# Patient Record
Sex: FEMALE | Race: WHITE | NOT HISPANIC OR LATINO
[De-identification: names, ages, dates, MRNs, and addresses within clinical notes are randomized per-mention and may not be internally consistent; named-entity substitution may affect disease eponyms.]

---

## 2017-08-14 ENCOUNTER — APPOINTMENT (OUTPATIENT)
Dept: OBGYN | Facility: CLINIC | Age: 22
End: 2017-08-14

## 2018-03-10 ENCOUNTER — APPOINTMENT (OUTPATIENT)
Dept: OBGYN | Facility: CLINIC | Age: 23
End: 2018-03-10

## 2019-09-19 ENCOUNTER — APPOINTMENT (OUTPATIENT)
Dept: OBGYN | Facility: CLINIC | Age: 24
End: 2019-09-19
Payer: COMMERCIAL

## 2019-09-19 VITALS
SYSTOLIC BLOOD PRESSURE: 100 MMHG | HEIGHT: 64 IN | TEMPERATURE: 97.9 F | BODY MASS INDEX: 20.29 KG/M2 | WEIGHT: 118.83 LBS | DIASTOLIC BLOOD PRESSURE: 70 MMHG

## 2019-09-19 DIAGNOSIS — Z82.49 FAMILY HISTORY OF ISCHEMIC HEART DISEASE AND OTHER DISEASES OF THE CIRCULATORY SYSTEM: ICD-10-CM

## 2019-09-19 DIAGNOSIS — Z78.9 OTHER SPECIFIED HEALTH STATUS: ICD-10-CM

## 2019-09-19 DIAGNOSIS — Z80.0 FAMILY HISTORY OF MALIGNANT NEOPLASM OF DIGESTIVE ORGANS: ICD-10-CM

## 2019-09-19 DIAGNOSIS — Z87.440 PERSONAL HISTORY OF URINARY (TRACT) INFECTIONS: ICD-10-CM

## 2019-09-19 DIAGNOSIS — Z83.3 FAMILY HISTORY OF DIABETES MELLITUS: ICD-10-CM

## 2019-09-19 DIAGNOSIS — Z11.3 ENCOUNTER FOR SCREENING FOR INFECTIONS WITH A PREDOMINANTLY SEXUAL MODE OF TRANSMISSION: ICD-10-CM

## 2019-09-19 PROCEDURE — 36415 COLL VENOUS BLD VENIPUNCTURE: CPT

## 2019-09-19 PROCEDURE — 99385 PREV VISIT NEW AGE 18-39: CPT | Mod: 25

## 2019-09-19 NOTE — HISTORY OF PRESENT ILLNESS
[___ Year(s) Ago] : [unfilled] year(s) ago [Good] : being in good health [Healthy Diet] : a healthy diet [Regular Exercise] : regular exercise [Reproductive Age] : is of reproductive age [Sexually Active] : is sexually active [Menstrual Problems] : reports normal menses [Contraception] : does not use contraception

## 2019-09-19 NOTE — CHIEF COMPLAINT
[Initial Visit] : initial GYN visit [FreeTextEntry1] : Last exam 12/17 in [facility]\par Went off ocp (otc lo) 6/19 because she thought she was gaining weight. She has lost 15 lbs.\par Off ocp menses regular, not heavy, worse acne.\par hx of congenital kidney disorder requiring surgery 2005 (vesicoureteral reflux?), sees urologist q yr ( saw today). (Dr. Fajardo)\par Recently engaged, does not use bc at this time. Wants to make sure she does not have mense at wedding so would like to try a different ocp.\par Desires std testing today

## 2019-09-23 LAB
HBV SURFACE AG SER QL: NONREACTIVE
HIV1+2 AB SPEC QL IA.RAPID: NONREACTIVE
SOURCE AMPLIFICATION: NORMAL
T PALLIDUM AB SER QL IA: NEGATIVE
T VAGINALIS RRNA SPEC QL NAA+PROBE: NOT DETECTED

## 2020-03-02 ENCOUNTER — APPOINTMENT (OUTPATIENT)
Dept: OBGYN | Facility: CLINIC | Age: 25
End: 2020-03-02
Payer: COMMERCIAL

## 2020-03-02 VITALS
HEIGHT: 64 IN | SYSTOLIC BLOOD PRESSURE: 110 MMHG | DIASTOLIC BLOOD PRESSURE: 68 MMHG | BODY MASS INDEX: 19.63 KG/M2 | WEIGHT: 115 LBS

## 2020-03-02 DIAGNOSIS — Z86.19 PERSONAL HISTORY OF OTHER INFECTIOUS AND PARASITIC DISEASES: ICD-10-CM

## 2020-03-02 DIAGNOSIS — Z30.41 ENCOUNTER FOR SURVEILLANCE OF CONTRACEPTIVE PILLS: ICD-10-CM

## 2020-03-02 PROCEDURE — 99214 OFFICE O/P EST MOD 30 MIN: CPT

## 2020-03-02 NOTE — CHIEF COMPLAINT
[Follow Up] : follow up GYN visit [FreeTextEntry1] : Happy with ocp, no complaints.\par C/o vaginal and vulvalar itching since last week, was on vacation, has been using new soap, no recent abx. Min white vaginal dc, not clumpy.

## 2020-03-02 NOTE — PHYSICAL EXAM
[Awake] : awake [Alert] : alert [Acute Distress] : no acute distress [Mass] : no breast mass [Axillary LAD] : no axillary lymphadenopathy [Nipple Discharge] : no nipple discharge [Soft] : soft [Tender] : non tender [Oriented x3] : oriented to person, place, and time [Labia Majora Erythema] : erythema of the labia majora [Labia Minora Erythema] : erythema of the labia minora [Normal] : cervix [No Bleeding] : there was no active vaginal bleeding

## 2020-03-05 LAB
CANDIDA VAG CYTO: NOT DETECTED
G VAGINALIS+PREV SP MTYP VAG QL MICRO: NOT DETECTED
T VAGINALIS VAG QL WET PREP: NOT DETECTED

## 2020-10-22 ENCOUNTER — RX RENEWAL (OUTPATIENT)
Age: 25
End: 2020-10-22

## 2020-10-30 ENCOUNTER — APPOINTMENT (OUTPATIENT)
Dept: OBGYN | Facility: CLINIC | Age: 25
End: 2020-10-30
Payer: SELF-PAY

## 2020-10-30 VITALS
RESPIRATION RATE: 16 BRPM | TEMPERATURE: 97.3 F | BODY MASS INDEX: 20.14 KG/M2 | WEIGHT: 118 LBS | DIASTOLIC BLOOD PRESSURE: 64 MMHG | SYSTOLIC BLOOD PRESSURE: 102 MMHG | HEIGHT: 64 IN

## 2020-10-30 PROCEDURE — 99395 PREV VISIT EST AGE 18-39: CPT

## 2020-10-30 RX ORDER — TERCONAZOLE 8 MG/G
0.8 CREAM VAGINAL
Qty: 1 | Refills: 0 | Status: DISCONTINUED | COMMUNITY
Start: 2020-03-02 | End: 2020-10-30

## 2020-10-30 NOTE — HISTORY OF PRESENT ILLNESS
[Currently Active] : currently active [Men] : men [Vaginal] : vaginal [TextBox_4] : She did not get  in August, now planned for 7/21.\par No complaints. Happy with ocp. [PapSmeardate] : 2019

## 2020-10-30 NOTE — PHYSICAL EXAM
[Appropriately responsive] : appropriately responsive [Alert] : alert [No Acute Distress] : no acute distress [Soft] : soft [Non-tender] : non-tender [Non-distended] : non-distended [No HSM] : No HSM [No Lesions] : no lesions [No Mass] : no mass [Oriented x3] : oriented x3 [Examination Of The Breasts] : a normal appearance [No Masses] : no breast masses were palpable [Labia Majora] : normal [Labia Minora] : normal [Normal] : normal [Tenderness] : nontender [Enlarged ___ wks] : not enlarged [Mass ___ cm] : no uterine mass was palpated [Uterine Adnexae] : non-palpable [FreeTextEntry5] : pap done

## 2020-11-01 LAB
C TRACH RRNA SPEC QL NAA+PROBE: NOT DETECTED
N GONORRHOEA RRNA SPEC QL NAA+PROBE: NOT DETECTED
SOURCE TP AMPLIFICATION: NORMAL

## 2020-12-23 PROBLEM — Z86.19 HISTORY OF CANDIDAL VULVOVAGINITIS: Status: RESOLVED | Noted: 2020-03-02 | Resolved: 2020-12-23

## 2021-04-08 ENCOUNTER — RX RENEWAL (OUTPATIENT)
Age: 26
End: 2021-04-08

## 2021-04-30 ENCOUNTER — APPOINTMENT (OUTPATIENT)
Dept: OBGYN | Facility: CLINIC | Age: 26
End: 2021-04-30
Payer: COMMERCIAL

## 2021-04-30 VITALS
TEMPERATURE: 97.5 F | WEIGHT: 117 LBS | BODY MASS INDEX: 19.97 KG/M2 | RESPIRATION RATE: 16 BRPM | DIASTOLIC BLOOD PRESSURE: 62 MMHG | HEIGHT: 64 IN | SYSTOLIC BLOOD PRESSURE: 106 MMHG

## 2021-04-30 PROCEDURE — 99072 ADDL SUPL MATRL&STAF TM PHE: CPT

## 2021-04-30 PROCEDURE — 99213 OFFICE O/P EST LOW 20 MIN: CPT

## 2021-04-30 RX ORDER — NORETHINDRONE ACETATE AND ETHINYL ESTRADIOL 1MG-20(21)
1-20 KIT ORAL DAILY
Qty: 3 | Refills: 1 | Status: DISCONTINUED | COMMUNITY
Start: 2019-09-19 | End: 2021-04-30

## 2021-04-30 NOTE — HISTORY OF PRESENT ILLNESS
[Currently Active] : currently active [Men] : men [Vaginal] : vaginal [No] : No [FreeTextEntry1] : 6 month check on ocp. No complaints, getting  in July. [place] in [place].

## 2021-09-30 ENCOUNTER — RX RENEWAL (OUTPATIENT)
Age: 26
End: 2021-09-30

## 2021-11-05 ENCOUNTER — APPOINTMENT (OUTPATIENT)
Dept: OBGYN | Facility: CLINIC | Age: 26
End: 2021-11-05
Payer: COMMERCIAL

## 2021-11-05 VITALS
TEMPERATURE: 97.7 F | HEIGHT: 64 IN | WEIGHT: 121 LBS | DIASTOLIC BLOOD PRESSURE: 70 MMHG | BODY MASS INDEX: 20.66 KG/M2 | SYSTOLIC BLOOD PRESSURE: 110 MMHG

## 2021-11-05 DIAGNOSIS — Z01.419 ENCOUNTER FOR GYNECOLOGICAL EXAMINATION (GENERAL) (ROUTINE) W/OUT ABNORMAL FINDINGS: ICD-10-CM

## 2021-11-05 PROCEDURE — 99395 PREV VISIT EST AGE 18-39: CPT

## 2021-11-05 RX ORDER — ALBUTEROL 90 MCG
AEROSOL (GRAM) INHALATION
Refills: 0 | Status: ACTIVE | COMMUNITY

## 2021-11-05 NOTE — HISTORY OF PRESENT ILLNESS
[TextBox_4] : Got  over the summer. No gyn compaints. happy with OCP. [PapSmeardate] : 10/2020 [No] : Patient does not have concerns regarding sex [Currently Active] : currently active [Men] : men [Vaginal] : vaginal

## 2021-11-05 NOTE — PHYSICAL EXAM
[Appropriately responsive] : appropriately responsive [Alert] : alert [No Acute Distress] : no acute distress [No Murmurs] : no murmurs [Soft] : soft [Non-tender] : non-tender [Non-distended] : non-distended [No HSM] : No HSM [No Lesions] : no lesions [No Mass] : no mass [Oriented x3] : oriented x3 [Examination Of The Breasts] : a normal appearance [No Masses] : no breast masses were palpable [Labia Majora] : normal [Labia Minora] : normal [Normal] : normal [Tenderness] : nontender [Enlarged ___ wks] : not enlarged [Mass ___ cm] : no uterine mass was palpated [Uterine Adnexae] : non-palpable [FreeTextEntry5] : pap done

## 2022-04-27 ENCOUNTER — RX RENEWAL (OUTPATIENT)
Age: 27
End: 2022-04-27

## 2022-05-13 ENCOUNTER — APPOINTMENT (OUTPATIENT)
Dept: OBGYN | Facility: CLINIC | Age: 27
End: 2022-05-13
Payer: COMMERCIAL

## 2022-05-13 VITALS
HEIGHT: 64 IN | DIASTOLIC BLOOD PRESSURE: 60 MMHG | BODY MASS INDEX: 20.66 KG/M2 | SYSTOLIC BLOOD PRESSURE: 98 MMHG | WEIGHT: 121 LBS

## 2022-05-13 DIAGNOSIS — Z30.41 ENCOUNTER FOR SURVEILLANCE OF CONTRACEPTIVE PILLS: ICD-10-CM

## 2022-05-13 PROCEDURE — 99213 OFFICE O/P EST LOW 20 MIN: CPT

## 2022-05-13 NOTE — HISTORY OF PRESENT ILLNESS
[FreeTextEntry1] : 6 month check on ocp, no complaints, bought a house in [place], happy with ocp. [No] : Patient does not have concerns regarding sex [Currently Active] : currently active [Men] : men [Vaginal] : vaginal

## 2022-07-10 ENCOUNTER — EMERGENCY (EMERGENCY)
Facility: HOSPITAL | Age: 27
LOS: 0 days | Discharge: HOME | End: 2022-07-11
Attending: EMERGENCY MEDICINE
Payer: COMMERCIAL

## 2022-07-10 VITALS
OXYGEN SATURATION: 100 % | TEMPERATURE: 98 F | RESPIRATION RATE: 19 BRPM | WEIGHT: 119.93 LBS | HEART RATE: 91 BPM | HEIGHT: 64 IN | DIASTOLIC BLOOD PRESSURE: 82 MMHG | SYSTOLIC BLOOD PRESSURE: 125 MMHG

## 2022-07-10 DIAGNOSIS — I27.20 PULMONARY HYPERTENSION, UNSPECIFIED: ICD-10-CM

## 2022-07-10 DIAGNOSIS — R07.89 OTHER CHEST PAIN: ICD-10-CM

## 2022-07-10 DIAGNOSIS — J45.901 UNSPECIFIED ASTHMA WITH (ACUTE) EXACERBATION: ICD-10-CM

## 2022-07-10 DIAGNOSIS — R06.02 SHORTNESS OF BREATH: ICD-10-CM

## 2022-07-10 DIAGNOSIS — Z20.822 CONTACT WITH AND (SUSPECTED) EXPOSURE TO COVID-19: ICD-10-CM

## 2022-07-10 DIAGNOSIS — R05.9 COUGH, UNSPECIFIED: ICD-10-CM

## 2022-07-10 PROCEDURE — 36415 COLL VENOUS BLD VENIPUNCTURE: CPT

## 2022-07-10 PROCEDURE — 99284 EMERGENCY DEPT VISIT MOD MDM: CPT

## 2022-07-10 PROCEDURE — 93010 ELECTROCARDIOGRAM REPORT: CPT

## 2022-07-10 PROCEDURE — 71045 X-RAY EXAM CHEST 1 VIEW: CPT

## 2022-07-10 PROCEDURE — 84702 CHORIONIC GONADOTROPIN TEST: CPT

## 2022-07-10 PROCEDURE — 84484 ASSAY OF TROPONIN QUANT: CPT

## 2022-07-10 PROCEDURE — 83735 ASSAY OF MAGNESIUM: CPT

## 2022-07-10 PROCEDURE — 93005 ELECTROCARDIOGRAM TRACING: CPT

## 2022-07-10 PROCEDURE — 94640 AIRWAY INHALATION TREATMENT: CPT

## 2022-07-10 PROCEDURE — 80053 COMPREHEN METABOLIC PANEL: CPT

## 2022-07-10 PROCEDURE — 85025 COMPLETE CBC W/AUTO DIFF WBC: CPT

## 2022-07-10 PROCEDURE — 0241U: CPT

## 2022-07-10 PROCEDURE — 99285 EMERGENCY DEPT VISIT HI MDM: CPT | Mod: 25

## 2022-07-10 NOTE — ED ADULT TRIAGE NOTE - CHIEF COMPLAINT QUOTE
Pt presents to the ED c/o SOB, numbness, nausea, chest pain, and tremors x1.5 hours. PMH of asthma and currently being tested for pulmonary HTN. Pt took Albuterol PTA without relief. No respiraotry distress noted, SpO2 99% on room air, . Pt sent for EKG.

## 2022-07-11 VITALS
SYSTOLIC BLOOD PRESSURE: 109 MMHG | HEART RATE: 67 BPM | TEMPERATURE: 98 F | DIASTOLIC BLOOD PRESSURE: 73 MMHG | RESPIRATION RATE: 22 BRPM | OXYGEN SATURATION: 99 %

## 2022-07-11 LAB
ALBUMIN SERPL ELPH-MCNC: 3.3 G/DL — SIGNIFICANT CHANGE UP (ref 3.3–5)
ALP SERPL-CCNC: 54 U/L — SIGNIFICANT CHANGE UP (ref 40–120)
ALT FLD-CCNC: 20 U/L — SIGNIFICANT CHANGE UP (ref 12–78)
ANION GAP SERPL CALC-SCNC: 5 MMOL/L — SIGNIFICANT CHANGE UP (ref 5–17)
AST SERPL-CCNC: 16 U/L — SIGNIFICANT CHANGE UP (ref 15–37)
BASOPHILS # BLD AUTO: 0.07 K/UL — SIGNIFICANT CHANGE UP (ref 0–0.2)
BASOPHILS NFR BLD AUTO: 0.8 % — SIGNIFICANT CHANGE UP (ref 0–2)
BILIRUB SERPL-MCNC: 0.3 MG/DL — SIGNIFICANT CHANGE UP (ref 0.2–1.2)
BUN SERPL-MCNC: 14 MG/DL — SIGNIFICANT CHANGE UP (ref 7–23)
CALCIUM SERPL-MCNC: 8.4 MG/DL — LOW (ref 8.5–10.1)
CHLORIDE SERPL-SCNC: 113 MMOL/L — HIGH (ref 96–108)
CO2 SERPL-SCNC: 24 MMOL/L — SIGNIFICANT CHANGE UP (ref 22–31)
CREAT SERPL-MCNC: 0.76 MG/DL — SIGNIFICANT CHANGE UP (ref 0.5–1.3)
EGFR: 109 ML/MIN/1.73M2 — SIGNIFICANT CHANGE UP
EOSINOPHIL # BLD AUTO: 0.07 K/UL — SIGNIFICANT CHANGE UP (ref 0–0.5)
EOSINOPHIL NFR BLD AUTO: 0.8 % — SIGNIFICANT CHANGE UP (ref 0–6)
FLUAV AG NPH QL: SIGNIFICANT CHANGE UP
FLUBV AG NPH QL: SIGNIFICANT CHANGE UP
GLUCOSE SERPL-MCNC: 97 MG/DL — SIGNIFICANT CHANGE UP (ref 70–99)
HCG SERPL-ACNC: <1 MIU/ML — SIGNIFICANT CHANGE UP
HCT VFR BLD CALC: 35.3 % — SIGNIFICANT CHANGE UP (ref 34.5–45)
HGB BLD-MCNC: 11.9 G/DL — SIGNIFICANT CHANGE UP (ref 11.5–15.5)
IMM GRANULOCYTES NFR BLD AUTO: 0.4 % — SIGNIFICANT CHANGE UP (ref 0–1.5)
LYMPHOCYTES # BLD AUTO: 2.45 K/UL — SIGNIFICANT CHANGE UP (ref 1–3.3)
LYMPHOCYTES # BLD AUTO: 29.6 % — SIGNIFICANT CHANGE UP (ref 13–44)
MAGNESIUM SERPL-MCNC: 2.1 MG/DL — SIGNIFICANT CHANGE UP (ref 1.6–2.6)
MCHC RBC-ENTMCNC: 30.7 PG — SIGNIFICANT CHANGE UP (ref 27–34)
MCHC RBC-ENTMCNC: 33.7 GM/DL — SIGNIFICANT CHANGE UP (ref 32–36)
MCV RBC AUTO: 91 FL — SIGNIFICANT CHANGE UP (ref 80–100)
MONOCYTES # BLD AUTO: 0.69 K/UL — SIGNIFICANT CHANGE UP (ref 0–0.9)
MONOCYTES NFR BLD AUTO: 8.3 % — SIGNIFICANT CHANGE UP (ref 2–14)
NEUTROPHILS # BLD AUTO: 4.98 K/UL — SIGNIFICANT CHANGE UP (ref 1.8–7.4)
NEUTROPHILS NFR BLD AUTO: 60.1 % — SIGNIFICANT CHANGE UP (ref 43–77)
PLATELET # BLD AUTO: 239 K/UL — SIGNIFICANT CHANGE UP (ref 150–400)
POTASSIUM SERPL-MCNC: 3.9 MMOL/L — SIGNIFICANT CHANGE UP (ref 3.5–5.3)
POTASSIUM SERPL-SCNC: 3.9 MMOL/L — SIGNIFICANT CHANGE UP (ref 3.5–5.3)
PROT SERPL-MCNC: 6.6 GM/DL — SIGNIFICANT CHANGE UP (ref 6–8.3)
RBC # BLD: 3.88 M/UL — SIGNIFICANT CHANGE UP (ref 3.8–5.2)
RBC # FLD: 12.5 % — SIGNIFICANT CHANGE UP (ref 10.3–14.5)
RSV RNA NPH QL NAA+NON-PROBE: SIGNIFICANT CHANGE UP
SARS-COV-2 RNA SPEC QL NAA+PROBE: SIGNIFICANT CHANGE UP
SODIUM SERPL-SCNC: 142 MMOL/L — SIGNIFICANT CHANGE UP (ref 135–145)
TROPONIN I, HIGH SENSITIVITY RESULT: 4.15 NG/L — SIGNIFICANT CHANGE UP
WBC # BLD: 8.29 K/UL — SIGNIFICANT CHANGE UP (ref 3.8–10.5)
WBC # FLD AUTO: 8.29 K/UL — SIGNIFICANT CHANGE UP (ref 3.8–10.5)

## 2022-07-11 PROCEDURE — 71045 X-RAY EXAM CHEST 1 VIEW: CPT | Mod: 26

## 2022-07-11 RX ORDER — IPRATROPIUM/ALBUTEROL SULFATE 18-103MCG
3 AEROSOL WITH ADAPTER (GRAM) INHALATION ONCE
Refills: 0 | Status: COMPLETED | OUTPATIENT
Start: 2022-07-11 | End: 2022-07-11

## 2022-07-11 RX ADMIN — Medication 20 MILLIGRAM(S): at 03:48

## 2022-07-11 RX ADMIN — Medication 3 MILLILITER(S): at 02:25

## 2022-07-11 NOTE — ED PROVIDER NOTE - CARE PROVIDER_API CALL
Marlon Lynn  INTERNAL MEDICINE  [address] [place] [address]  Phone: [phone]  Fax: [phone]  Follow Up Time: 1-3 Days

## 2022-07-11 NOTE — ED PROVIDER NOTE - OBJECTIVE STATEMENT
27-year-old female history of asthma and recent work-up for diagnosis of pulmonary hypertension presents with sudden left sternal border pain and shortness of breath associated with lip tingling that started at approximately 930 last night.  Patient states that she only experiences pain when she takes deep breaths or coughs.  Patient notes that she used her inhaler without relief.  Patient does not have a nebulizer at home.  Patient denies any recent travel, trauma or surgery.  Patient denies any leg pain or swelling.  Patient notes that the pain is moderate in intensity but improved since the onset.

## 2022-07-11 NOTE — ED ADULT NURSE NOTE - OBJECTIVE STATEMENT
Pt brought to ED from house with complaints of shortness of breath. Pt received resting comfortably in stretcher. Pt's boyfriend at bedside. Pt states when lies flat to go to bed, she begins having difficulty breathing; shortness of breath has subsided. Pt has history of chronic kidney infections, asthma, and gluten allergy. Pt states she recently learned that she has pulmonary hypertension and has been feeling uneasy about it. Pt is ambulatory. A&O x4. Airway is patent, breathing is even and unlabored. Skin is warm and dry. PMS x4 extremities. Pt endorses chest pain earlier, but is currently not experiecing. Pt denies numbness and tingling in arms and legs. Pt denies blurred vision. Pt endorses chronic migraine headaches. Pt denies abdominal pain and n/v/d. Pt denies difficulty urinating. Last BM was today. 20G IV placed to right AC, flushes without difficulty. Blood and nasal swab obtained and sent to lab for analysis. No additional requests or complaints. Patient safety maintained. Will continue to monitor. Pt brought to ED from house with complaints of shortness of breath. Pt received resting comfortably in stretcher. Pt's boyfriend at bedside. Pt states when lies flat to go to bed, she begins having difficulty breathing; shortness of breath has subsided. Pt has history of chronic kidney infections, asthma, and gluten allergy. Pt states she recently learned that she has pulmonary hypertension and has been feeling uneasy about it. Pt is ambulatory. A&O x4. Airway is patent, breathing is even and unlabored. Skin is warm and dry. PMS x4 extremities. Pt endorses chest pain earlier, but is currently not experiencing. Pt denies numbness and tingling in arms and legs. Pt denies blurred vision. Pt endorses chronic migraine headaches. Pt denies abdominal pain and n/v/d. Pt denies difficulty urinating. Last BM was today. 20G IV placed to right AC, flushes without difficulty. Blood and nasal swab obtained and sent to lab for analysis. No additional requests or complaints. Patient safety maintained. Will continue to monitor.

## 2022-07-11 NOTE — ED PROVIDER NOTE - PATIENT PORTAL LINK FT
You can access the FollowMyHealth Patient Portal offered by [facility] by registering at the following website: [email or web address]. By joining [employer]’s FollowMyHealth portal, you will also be able to view your health information using other applications (apps) compatible with our system.

## 2022-07-11 NOTE — ED PROVIDER NOTE - CLINICAL SUMMARY MEDICAL DECISION MAKING FREE TEXT BOX
Patient's history and physical is consistent with mild asthma exacerbation.  Patient has unremarkable EKG.  We will get 1 set of cardiac enzymes, CBC, CMP and chest x-ray.  We will treat with bronchodilators and corticosteroids and reassess.  Patient has follow-up with pulmonology and a sleep study already scheduled.

## 2022-07-11 NOTE — ED PROVIDER NOTE - NS ED MD DISPO DISCHARGE
Patient called stating she spoke with nurse Sridevi Blackwood a while back about some diarrhea issues. She still has ongoing issues with diarrhea. She wants to speak with nurse to see what she can do about it. Please call to discuss. Home

## 2022-10-24 NOTE — ED ADULT NURSE NOTE - NS ED NURSE LEVEL OF CONSCIOUSNESS MENTAL STATUS
venlafaxine XR (EFFEXOR XR) 150 MG 24 hr capsule 90 capsule 0 7/25/2022     Last seen: 3/28/22    Encounter routed to provider to review and advise.           Awake/Alert/Cooperative

## 2022-11-18 ENCOUNTER — APPOINTMENT (OUTPATIENT)
Dept: OBGYN | Facility: CLINIC | Age: 27
End: 2022-11-18

## 2023-01-30 ENCOUNTER — EMERGENCY (EMERGENCY)
Facility: HOSPITAL | Age: 28
LOS: 0 days | Discharge: HOME | End: 2023-01-30
Attending: EMERGENCY MEDICINE
Payer: COMMERCIAL

## 2023-01-30 VITALS
SYSTOLIC BLOOD PRESSURE: 122 MMHG | OXYGEN SATURATION: 99 % | HEART RATE: 71 BPM | RESPIRATION RATE: 17 BRPM | DIASTOLIC BLOOD PRESSURE: 71 MMHG

## 2023-01-30 VITALS — WEIGHT: 119.93 LBS | HEIGHT: 63 IN

## 2023-01-30 DIAGNOSIS — R11.0 NAUSEA: ICD-10-CM

## 2023-01-30 DIAGNOSIS — R07.89 OTHER CHEST PAIN: ICD-10-CM

## 2023-01-30 DIAGNOSIS — K21.9 GASTRO-ESOPHAGEAL REFLUX DISEASE WITHOUT ESOPHAGITIS: ICD-10-CM

## 2023-01-30 DIAGNOSIS — R10.9 UNSPECIFIED ABDOMINAL PAIN: ICD-10-CM

## 2023-01-30 DIAGNOSIS — R07.2 PRECORDIAL PAIN: ICD-10-CM

## 2023-01-30 DIAGNOSIS — Z20.822 CONTACT WITH AND (SUSPECTED) EXPOSURE TO COVID-19: ICD-10-CM

## 2023-01-30 DIAGNOSIS — J45.909 UNSPECIFIED ASTHMA, UNCOMPLICATED: ICD-10-CM

## 2023-01-30 LAB
ALBUMIN SERPL ELPH-MCNC: 3.9 G/DL — SIGNIFICANT CHANGE UP (ref 3.3–5)
ALP SERPL-CCNC: 69 U/L — SIGNIFICANT CHANGE UP (ref 40–120)
ALT FLD-CCNC: 27 U/L — SIGNIFICANT CHANGE UP (ref 12–78)
ANION GAP SERPL CALC-SCNC: 4 MMOL/L — LOW (ref 5–17)
APPEARANCE UR: CLEAR — SIGNIFICANT CHANGE UP
AST SERPL-CCNC: 19 U/L — SIGNIFICANT CHANGE UP (ref 15–37)
BASOPHILS # BLD AUTO: 0.07 K/UL — SIGNIFICANT CHANGE UP (ref 0–0.2)
BASOPHILS NFR BLD AUTO: 0.8 % — SIGNIFICANT CHANGE UP (ref 0–2)
BILIRUB SERPL-MCNC: 0.4 MG/DL — SIGNIFICANT CHANGE UP (ref 0.2–1.2)
BILIRUB UR-MCNC: NEGATIVE — SIGNIFICANT CHANGE UP
BUN SERPL-MCNC: 15 MG/DL — SIGNIFICANT CHANGE UP (ref 7–23)
CALCIUM SERPL-MCNC: 9.3 MG/DL — SIGNIFICANT CHANGE UP (ref 8.5–10.1)
CHLORIDE SERPL-SCNC: 110 MMOL/L — HIGH (ref 96–108)
CO2 SERPL-SCNC: 24 MMOL/L — SIGNIFICANT CHANGE UP (ref 22–31)
COLOR SPEC: YELLOW — SIGNIFICANT CHANGE UP
CREAT SERPL-MCNC: 0.84 MG/DL — SIGNIFICANT CHANGE UP (ref 0.5–1.3)
D DIMER BLD IA.RAPID-MCNC: <150 NG/ML DDU — SIGNIFICANT CHANGE UP
DIFF PNL FLD: NEGATIVE — SIGNIFICANT CHANGE UP
EGFR: 96 ML/MIN/1.73M2 — SIGNIFICANT CHANGE UP
EOSINOPHIL # BLD AUTO: 0.01 K/UL — SIGNIFICANT CHANGE UP (ref 0–0.5)
EOSINOPHIL NFR BLD AUTO: 0.1 % — SIGNIFICANT CHANGE UP (ref 0–6)
FLUAV AG NPH QL: SIGNIFICANT CHANGE UP
FLUBV AG NPH QL: SIGNIFICANT CHANGE UP
GLUCOSE SERPL-MCNC: 117 MG/DL — HIGH (ref 70–99)
GLUCOSE UR QL: NEGATIVE — SIGNIFICANT CHANGE UP
HCT VFR BLD CALC: 40 % — SIGNIFICANT CHANGE UP (ref 34.5–45)
HGB BLD-MCNC: 13.7 G/DL — SIGNIFICANT CHANGE UP (ref 11.5–15.5)
IMM GRANULOCYTES NFR BLD AUTO: 0.4 % — SIGNIFICANT CHANGE UP (ref 0–0.9)
KETONES UR-MCNC: NEGATIVE — SIGNIFICANT CHANGE UP
LEUKOCYTE ESTERASE UR-ACNC: NEGATIVE — SIGNIFICANT CHANGE UP
LIDOCAIN IGE QN: 117 U/L — SIGNIFICANT CHANGE UP (ref 73–393)
LYMPHOCYTES # BLD AUTO: 2.11 K/UL — SIGNIFICANT CHANGE UP (ref 1–3.3)
LYMPHOCYTES # BLD AUTO: 23.7 % — SIGNIFICANT CHANGE UP (ref 13–44)
MCHC RBC-ENTMCNC: 30.7 PG — SIGNIFICANT CHANGE UP (ref 27–34)
MCHC RBC-ENTMCNC: 34.3 GM/DL — SIGNIFICANT CHANGE UP (ref 32–36)
MCV RBC AUTO: 89.7 FL — SIGNIFICANT CHANGE UP (ref 80–100)
MONOCYTES # BLD AUTO: 0.54 K/UL — SIGNIFICANT CHANGE UP (ref 0–0.9)
MONOCYTES NFR BLD AUTO: 6.1 % — SIGNIFICANT CHANGE UP (ref 2–14)
NEUTROPHILS # BLD AUTO: 6.13 K/UL — SIGNIFICANT CHANGE UP (ref 1.8–7.4)
NEUTROPHILS NFR BLD AUTO: 68.9 % — SIGNIFICANT CHANGE UP (ref 43–77)
NITRITE UR-MCNC: NEGATIVE — SIGNIFICANT CHANGE UP
PH UR: 6 — SIGNIFICANT CHANGE UP (ref 5–8)
PLATELET # BLD AUTO: 247 K/UL — SIGNIFICANT CHANGE UP (ref 150–400)
POTASSIUM SERPL-MCNC: 3.8 MMOL/L — SIGNIFICANT CHANGE UP (ref 3.5–5.3)
POTASSIUM SERPL-SCNC: 3.8 MMOL/L — SIGNIFICANT CHANGE UP (ref 3.5–5.3)
PROT SERPL-MCNC: 7.6 GM/DL — SIGNIFICANT CHANGE UP (ref 6–8.3)
PROT UR-MCNC: NEGATIVE — SIGNIFICANT CHANGE UP
RBC # BLD: 4.46 M/UL — SIGNIFICANT CHANGE UP (ref 3.8–5.2)
RBC # FLD: 12.6 % — SIGNIFICANT CHANGE UP (ref 10.3–14.5)
RSV RNA NPH QL NAA+NON-PROBE: SIGNIFICANT CHANGE UP
SARS-COV-2 RNA SPEC QL NAA+PROBE: SIGNIFICANT CHANGE UP
SODIUM SERPL-SCNC: 138 MMOL/L — SIGNIFICANT CHANGE UP (ref 135–145)
SP GR SPEC: 1.01 — SIGNIFICANT CHANGE UP (ref 1.01–1.02)
TROPONIN I, HIGH SENSITIVITY RESULT: 3.13 NG/L — SIGNIFICANT CHANGE UP
UROBILINOGEN FLD QL: NEGATIVE — SIGNIFICANT CHANGE UP
WBC # BLD: 8.9 K/UL — SIGNIFICANT CHANGE UP (ref 3.8–10.5)
WBC # FLD AUTO: 8.9 K/UL — SIGNIFICANT CHANGE UP (ref 3.8–10.5)

## 2023-01-30 PROCEDURE — 0241U: CPT

## 2023-01-30 PROCEDURE — 36415 COLL VENOUS BLD VENIPUNCTURE: CPT

## 2023-01-30 PROCEDURE — 83690 ASSAY OF LIPASE: CPT

## 2023-01-30 PROCEDURE — 99285 EMERGENCY DEPT VISIT HI MDM: CPT

## 2023-01-30 PROCEDURE — 71046 X-RAY EXAM CHEST 2 VIEWS: CPT

## 2023-01-30 PROCEDURE — 93010 ELECTROCARDIOGRAM REPORT: CPT

## 2023-01-30 PROCEDURE — 99285 EMERGENCY DEPT VISIT HI MDM: CPT | Mod: 25

## 2023-01-30 PROCEDURE — 81003 URINALYSIS AUTO W/O SCOPE: CPT

## 2023-01-30 PROCEDURE — 85025 COMPLETE CBC W/AUTO DIFF WBC: CPT

## 2023-01-30 PROCEDURE — 93005 ELECTROCARDIOGRAM TRACING: CPT

## 2023-01-30 PROCEDURE — 71046 X-RAY EXAM CHEST 2 VIEWS: CPT | Mod: 26

## 2023-01-30 PROCEDURE — 85379 FIBRIN DEGRADATION QUANT: CPT

## 2023-01-30 PROCEDURE — 80053 COMPREHEN METABOLIC PANEL: CPT

## 2023-01-30 PROCEDURE — 84484 ASSAY OF TROPONIN QUANT: CPT

## 2023-01-30 RX ADMIN — Medication 30 MILLILITER(S): at 04:16

## 2023-01-30 NOTE — ED PROVIDER NOTE - OBJECTIVE STATEMENT
Pt. is a 28 year old F with hx of reactive airway presents with substernal chest pain. Patient describes tightness in mid chest and pain with breathing.  Patient also got a burning pain in upper abdomen.  Took kaopectate without relief. Patient denies nasal congestion, coughing, or back pain.  Patient states she got anxious about the pain but tried to control her breathing and got tingling and numbness on her face around her mouth.  She had similar workup recently which was negative. LMP 3 wks ago.

## 2023-01-30 NOTE — ED ADULT NURSE NOTE - OBJECTIVE STATEMENT
Pt comes to ED c/o chest and epigastric pain since 9pm last night. Pt describes the pain as burning. Pt states she also had numbness around her mouth earlier in night. Pt reports diarrhea earlier in night and mild nausea. Pt has hx of frequent  kidney infections and UTIs. Pt denies any SOB. Pt is Aox4 in the ED and speaking in full sentences. Pt is ambulatory with steady gait. Pt has NKDA.

## 2023-01-30 NOTE — ED PROVIDER NOTE - CLINICAL SUMMARY MEDICAL DECISION MAKING FREE TEXT BOX
Nonspecific chest pain, burning pain in stomach; likely GERD.  Will give antacid, get EKG, CXR, labs including d dimer and troponin and will re-evaluate. See above notes for clinical decision making.

## 2023-01-30 NOTE — ED PROVIDER NOTE - NSFOLLOWUPINSTRUCTIONS_ED_ALL_ED_FT
Take tums or maalox before meals and bedtime.    See your doctor within 1 week.                                                                                   Nonspecific Chest Pain      Chest pain can be caused by many different conditions. Some causes of chest pain can be life-threatening. These will require treatment right away. Serious causes of chest pain include:  •Heart attack.      •A tear in the body's main blood vessel.      •Redness and swelling (inflammation) around your heart.      •Blood clot in your lungs.      Other causes of chest pain may not be so serious. These include:  •Heartburn.      •Anxiety or stress.      •Damage to bones or muscles in your chest.      •Lung infections.      Chest pain can feel like:  •Pain or discomfort in your chest.      •Crushing, pressure, aching, or squeezing pain.       •Burning or tingling.       •Dull or sharp pain that is worse when you move, cough, or take a deep breath.       •Pain or discomfort that is also felt in your back, neck, jaw, shoulder, or arm, or pain that spreads to any of these areas.      It is hard to know whether your pain is caused by something that is serious or something that is not so serious. So it is important to see your doctor right away if you have chest pain.      Follow these instructions at home:    Medicines     •Take over-the-counter and prescription medicines only as told by your doctor.      •If you were prescribed an antibiotic medicine, take it as told by your doctor. Do not stop taking the antibiotic even if you start to feel better.        Lifestyle   A plate along with examples of foods in a healthy diet.   •Rest as told by your doctor.      • Do not use any products that contain nicotine or tobacco, such as cigarettes, e-cigarettes, and chewing tobacco. If you need help quitting, ask your doctor.      • Do not drink alcohol.    •Make lifestyle changes as told by your doctor. These may include:  •Getting regular exercise. Ask your doctor what activities are safe for you.      •Eating a heart-healthy diet. A diet and nutrition specialist (dietitian) can help you to learn healthy eating options.      •Staying at a healthy weight.      •Treating diabetes or high blood pressure, if needed.      •Lowering your stress. Activities such as yoga and relaxation techniques can help.        General instructions     •Pay attention to any changes in your symptoms. Tell your doctor about them or any new symptoms.      •Avoid any activities that cause chest pain.      •Keep all follow-up visits as told by your doctor. This is important. You may need more testing if your chest pain does not go away.        Contact a doctor if:    •Your chest pain does not go away.      •You feel depressed.      •You have a fever.        Get help right away if:    •Your chest pain is worse.      •You have a cough that gets worse, or you cough up blood.      •You have very bad (severe) pain in your belly (abdomen).      •You pass out (faint).    •You have either of these for no clear reason:  •Sudden chest discomfort.      •Sudden discomfort in your arms, back, neck, or jaw.        •You have shortness of breath at any time.      •You suddenly start to sweat, or your skin gets clammy.      •You feel sick to your stomach (nauseous).      •You throw up (vomit).      •You suddenly feel lightheaded or dizzy.      •You feel very weak or tired.      •Your heart starts to beat fast, or it feels like it is skipping beats.      These symptoms may be an emergency. Do not wait to see if the symptoms will go away. Get medical help right away. Call your local emergency services (911 in the [place] Do not drive yourself to the hospital.       Summary    •Chest pain can be caused by many different conditions. The cause may be serious and need treatment right away. If you have chest pain, see your doctor right away.      •Follow your doctor's instructions for taking medicines and making lifestyle changes.       •Keep all follow-up visits as told by your doctor. This includes visits for any further testing if your chest pain does not go away.      •Be sure to know the signs that show that your condition has become worse. Get help right away if you have these symptoms.      This information is not intended to replace advice given to you by your health care provider. Make sure you discuss any questions you have with your health care provider.      Document Revised: 03/03/2022 Document Reviewed: 03/03/2022    Elsevier Patient Education © 2022 Elsevier Inc.

## 2023-01-30 NOTE — ED PROVIDER NOTE - CARE PLAN
Principal Discharge DX:	GERD (gastroesophageal reflux disease)   1 Principal Discharge DX:	GERD (gastroesophageal reflux disease)  Secondary Diagnosis:	Atypical chest pain

## 2023-01-30 NOTE — ED PROVIDER NOTE - PATIENT PORTAL LINK FT
You can access the FollowMyHealth Patient Portal offered by [facility] by registering at the following website: [email or web address]. By joining [employer]’s FollowMyHealth portal, you will also be able to view your health information using other applications (apps) compatible with our system.

## 2023-03-09 ENCOUNTER — NON-APPOINTMENT (OUTPATIENT)
Age: 28
End: 2023-03-09

## 2023-03-10 ENCOUNTER — APPOINTMENT (OUTPATIENT)
Dept: OBGYN | Facility: CLINIC | Age: 28
End: 2023-03-10
Payer: COMMERCIAL

## 2023-03-10 VITALS
HEIGHT: 64 IN | HEART RATE: 72 BPM | WEIGHT: 120 LBS | DIASTOLIC BLOOD PRESSURE: 79 MMHG | SYSTOLIC BLOOD PRESSURE: 113 MMHG | BODY MASS INDEX: 20.49 KG/M2

## 2023-03-10 DIAGNOSIS — Z11.51 ENCOUNTER FOR SCREENING FOR HUMAN PAPILLOMAVIRUS (HPV): ICD-10-CM

## 2023-03-10 DIAGNOSIS — Z12.39 ENCOUNTER FOR OTHER SCREENING FOR MALIGNANT NEOPLASM OF BREAST: ICD-10-CM

## 2023-03-10 DIAGNOSIS — Z12.4 ENCOUNTER FOR SCREENING FOR MALIGNANT NEOPLASM OF CERVIX: ICD-10-CM

## 2023-03-10 PROCEDURE — 36415 COLL VENOUS BLD VENIPUNCTURE: CPT

## 2023-03-10 PROCEDURE — 99385 PREV VISIT NEW AGE 18-39: CPT

## 2023-03-10 NOTE — HISTORY OF PRESENT ILLNESS
[FreeTextEntry1] : 27 y/o G0 LMP 3/1/23 F presents for new patient GYN exam, to establish care. Considering pregnancy in the near future. Reports regular monthly menses, no pelvic pain, ov. cysts/fibroids, discharge, hx STIs. No ha abnormal pap per pt. No significant family hx. Stopped OCP junel 2 months prior. \par \par MEDHX: recurrent kidney infections, asthma, questionable hx of pulmonary HTN\par SURGHX: kidney sx\par MEDS: PNVs\par ALL: NKDA, Gluten\par \par Employed as  @ [employer], sister is Genetic Counselor.. \par

## 2023-03-10 NOTE — COUNSELING
[Nutrition/ Exercise/ Weight Management] : nutrition, exercise, weight management [Vitamins/Supplements] : vitamins/supplements [Breast Self Exam] : breast self exam [Contraception/ Emergency Contraception/ Safe Sexual Practices] : contraception, emergency contraception, safe sexual practices [Preconception Care/ Fertility options] : preconception care, fertility options [Confidentiality] : confidentiality [STD (testing, results, tx)] : STD (testing, results, tx) [Vaccines] : vaccines [HPV Vaccine] : HPV Vaccine [Lab Results] : lab results [Medication Management] : medication management [Other ___] : [unfilled]

## 2023-03-10 NOTE — PLAN
[FreeTextEntry1] : Routine GYN\par -BSE\par -utd HPV vaccine\par -pap/hpv done today\par -declines sti screening\par \par Concern for Pulm HTM, questionable work up @ [facility], borderline findings per pt\par -referred to pulm/cardio (Matt/Loni); info provided today\par \par Preconception\par -advised condom use until cleard by MFM/cards/pulm-pt agrees\par -discussed timed intercourse/OPK use\par -continue PNVs\par -offered carrier screening today; pt does not want Horizon Panel-she d/w her sister and desires invitae comp panel. \par \par pt has upcoming appt for pre-conception counseling w/ [facility]. \par rto 1 yr or sooner as needed.\par During this visit 30 minutes were spent face-to-face with greater than 50% of this time dedicated to counseling.\par \par

## 2023-03-13 LAB — HPV HIGH+LOW RISK DNA PNL CVX: NOT DETECTED

## 2023-03-15 LAB — CYTOLOGY CVX/VAG DOC THIN PREP: NORMAL

## 2023-03-19 ENCOUNTER — TRANSCRIPTION ENCOUNTER (OUTPATIENT)
Age: 28
End: 2023-03-19

## 2023-03-20 ENCOUNTER — APPOINTMENT (OUTPATIENT)
Dept: OBGYN | Facility: CLINIC | Age: 28
End: 2023-03-20
Payer: COMMERCIAL

## 2023-03-20 DIAGNOSIS — Z31.69 ENCOUNTER FOR OTHER GENERAL COUNSELING AND ADVICE ON PROCREATION: ICD-10-CM

## 2023-03-20 PROCEDURE — 99214 OFFICE O/P EST MOD 30 MIN: CPT | Mod: 95

## 2023-03-24 ENCOUNTER — APPOINTMENT (OUTPATIENT)
Dept: MATERNAL FETAL MEDICINE | Facility: CLINIC | Age: 28
End: 2023-03-24

## 2023-03-24 ENCOUNTER — ASOB RESULT (OUTPATIENT)
Age: 28
End: 2023-03-24

## 2023-03-24 DIAGNOSIS — Z13.71 ENCOUNTER FOR NONPROCREATIVE SCREENING FOR GENETIC DISEASE CARRIER STATUS: ICD-10-CM

## 2023-03-27 NOTE — HISTORY OF PRESENT ILLNESS
[FreeTextEntry1] : 29yo G0 presenting for pre-conception consultation regarding medical history.\par LMP 3/1/23,\par \par Patients is concerned about possible abnormal echo finding. She reports having difficulty breathing x 2 years. started before she had covid (but covid may have exacerbated her symptoms). Not currently sx, but comes and goes. Sx worse when she moved residences. She got air purifiers. Had the air tested but nothing  abnomal identified. She saw a pulmonologist at [facility] but states no formal diagnosis was given. \par She had one echo that showed pulmonary hypertension, but then a follow up stress echo was normal- no reports available for review.\par She did PFTs which she reports as normal. \par She did a CT angio which she reports as negative\par She also did sleep apnea study which she reports as negative. \par \par She also has a history of asthma. She has been referred to dr. moreno/janice and plans to see him.\par She was recently seen at the hospital for sx but was not admitted. \par no recent steroids. no intubations ever\par \par She also has a history of kidney issues. She was diagnosed with reflux at the [facility] (right) and had surgery in 2005. for a while had UTIs yearly. but now none since 2018\par \par \par \par PMH: asthma and related pulm issues as above, renal issues (As above)\par PSH: kidney surgery as above (2005)\par gyn: no issues.  denies abnl paps/ fibroids/ oc cysts. q28-30d cycles, contraception: nothing since January\par all: nkda. gluten-free\par meds: simbicort BID, pnv, allegra\par \par fam hx: PGF- DM, MGM- heart failuture (70s), had pacemaker in her 40s, PGM- colon cancer\par uncle-parkinsons (60s). denies genetic family history\par \par soc: no tob, social etoh, no d. lives with  (hanny). works for [employer] ().  works for [employer] \par \par ethnic- [place]isian/Guamanian/polish\par - [place]isian\par \par accepts blood products\par no h/o VTE\par no prior expanded carrier screening\par

## 2023-03-27 NOTE — REASON FOR VISIT
[Consultation] : consultation for [Other Location: e.g. School (Enter Location, City,State)___] : at [unfilled], at the time of the visit. [Other Location: e.g. Home (Enter Location, City,State)___] : at [unfilled] [Spouse] : spouse [Patient] : the patient [Self] : self [FreeTextEntry2] : pre-conception planning

## 2023-03-27 NOTE — PLAN
[FreeTextEntry1] : 29yo G0 for preconception consultation.\par \par -Referred to cardio ob program for consultation and echo\par -Referred to pulm/ Dr. Gutierrez for pulmonary evaluation\par -Recommend baseline cmp to screen for residual renal issues\par -for serial u cx in pregnancy ( at inc'ed risk for recurrent UITs and ascending infx)\par -Referral to  for expanded carrier screening /invitae panel\par -Recommend bASA in pregnancy to reduce risk of pre-eclampsia (and dec risk to kidneys)\par \par pt counseled that without contraception, she could become pregnant at any time. to monitor sx/ menses for early pregnancy\par \par reassurance provided.\par \par follow up with +HPT or as indicated

## 2023-03-29 ENCOUNTER — NON-APPOINTMENT (OUTPATIENT)
Age: 28
End: 2023-03-29

## 2023-03-29 LAB
BASOPHILS # BLD AUTO: 0.07 K/UL
BASOPHILS NFR BLD AUTO: 0.9 %
EOSINOPHIL # BLD AUTO: 0.08 K/UL
EOSINOPHIL NFR BLD AUTO: 1.1 %
HCT VFR BLD CALC: 43.5 %
HGB A MFR BLD: 97.5 %
HGB A2 MFR BLD: 2.5 %
HGB BLD-MCNC: 14.2 G/DL
HGB FRACT BLD-IMP: NORMAL
IMM GRANULOCYTES NFR BLD AUTO: 0.4 %
LYMPHOCYTES # BLD AUTO: 2.41 K/UL
LYMPHOCYTES NFR BLD AUTO: 32 %
MAN DIFF?: NORMAL
MCHC RBC-ENTMCNC: 31.6 PG
MCHC RBC-ENTMCNC: 32.6 GM/DL
MCV RBC AUTO: 96.9 FL
MONOCYTES # BLD AUTO: 0.7 K/UL
MONOCYTES NFR BLD AUTO: 9.3 %
NEUTROPHILS # BLD AUTO: 4.23 K/UL
NEUTROPHILS NFR BLD AUTO: 56.3 %
PLATELET # BLD AUTO: 260 K/UL
RBC # BLD: 4.49 M/UL
RBC # FLD: 13.2 %
WBC # FLD AUTO: 7.52 K/UL

## 2023-04-13 LAB
MISCELLANEOUS TEST: NORMAL
PROC NAME: NORMAL

## 2023-04-14 ENCOUNTER — APPOINTMENT (OUTPATIENT)
Dept: CARDIOLOGY | Facility: CLINIC | Age: 28
End: 2023-04-14
Payer: COMMERCIAL

## 2023-04-14 DIAGNOSIS — Z87.09 PERSONAL HISTORY OF OTHER DISEASES OF THE RESPIRATORY SYSTEM: ICD-10-CM

## 2023-04-14 DIAGNOSIS — Z82.49 FAMILY HISTORY OF ISCHEMIC HEART DISEASE AND OTHER DISEASES OF THE CIRCULATORY SYSTEM: ICD-10-CM

## 2023-04-14 DIAGNOSIS — Z31.69 ENCOUNTER FOR OTHER GENERAL COUNSELING AND ADVICE ON PROCREATION: ICD-10-CM

## 2023-04-14 PROCEDURE — 99203 OFFICE O/P NEW LOW 30 MIN: CPT | Mod: 95

## 2023-04-14 RX ORDER — BUDESONIDE AND FORMOTEROL FUMARATE DIHYDRATE 160; 4.5 UG/1; UG/1
160-4.5 AEROSOL RESPIRATORY (INHALATION)
Refills: 0 | Status: ACTIVE | COMMUNITY
Start: 1900-01-01 | End: 1900-01-01

## 2023-04-14 RX ORDER — FEXOFENADINE HYDROCHLORIDE 60 MG/1
60 TABLET, FILM COATED ORAL
Refills: 0 | Status: ACTIVE | COMMUNITY

## 2023-04-14 NOTE — HISTORY OF PRESENT ILLNESS
[Home] : at home, [unfilled] , at the time of the visit. [Other Location: e.g. Home (Enter Location, City,State)___] : at [unfilled] [Verbal consent obtained from patient] : the patient, [unfilled] [FreeTextEntry1] : Ms. LARS AGUIRRE 28 year old F  at [facility] is here Apr 14th, 2023 to establish care in the Women's heart health program. Patient carries a strong FH of HF ( grandma) and HTN and DM and personal history of asthma ( uses post track runs 4- 5 miles )  ?? PHTN ( details not known ) had a TTE done at [facility] ( 2022 ) and all work up was WNL. Denies chest pain, dizziness, lightheadedness, palpitations or near syncope or syncope, orthopnea, PND and increasing lower extremity edema. Patient reports intermittent SOB associated with tingling in her lips. \par \par # CV risk assessment: Echocardiogram to assess the structural and valvular function.\par Exercise stress test to evaluate for functional status.\par Routine labs ordered - CBC, CMP, LIPIDS, TSH, Vit D, A1C\par Obtain records from [facility] of prior tests. \par Follow up in 2 months

## 2023-05-22 ENCOUNTER — APPOINTMENT (OUTPATIENT)
Dept: CARDIOLOGY | Facility: CLINIC | Age: 28
End: 2023-05-22
Payer: COMMERCIAL

## 2023-05-22 DIAGNOSIS — R06.02 SHORTNESS OF BREATH: ICD-10-CM

## 2023-05-22 PROCEDURE — 93306 TTE W/DOPPLER COMPLETE: CPT

## 2023-05-24 ENCOUNTER — OUTPATIENT (OUTPATIENT)
Dept: OUTPATIENT SERVICES | Facility: HOSPITAL | Age: 28
LOS: 1 days | End: 2023-05-24
Payer: COMMERCIAL

## 2023-05-24 ENCOUNTER — APPOINTMENT (OUTPATIENT)
Dept: CV DIAGNOSTICS | Facility: HOSPITAL | Age: 28
End: 2023-05-24

## 2023-05-24 DIAGNOSIS — Z31.69 ENCOUNTER FOR OTHER GENERAL COUNSELING AND ADVICE ON PROCREATION: ICD-10-CM

## 2023-05-24 PROCEDURE — 93016 CV STRESS TEST SUPVJ ONLY: CPT

## 2023-05-24 PROCEDURE — 93018 CV STRESS TEST I&R ONLY: CPT

## 2023-05-24 PROCEDURE — 93017 CV STRESS TEST TRACING ONLY: CPT

## 2023-07-04 PROBLEM — R06.02 SHORTNESS OF BREATH: Status: ACTIVE | Noted: 2023-04-14

## 2023-07-25 ENCOUNTER — APPOINTMENT (OUTPATIENT)
Dept: OBGYN | Facility: CLINIC | Age: 28
End: 2023-07-25
Payer: COMMERCIAL

## 2023-07-25 VITALS
WEIGHT: 125 LBS | HEIGHT: 64 IN | SYSTOLIC BLOOD PRESSURE: 104 MMHG | DIASTOLIC BLOOD PRESSURE: 70 MMHG | BODY MASS INDEX: 21.34 KG/M2

## 2023-07-25 DIAGNOSIS — Z11.3 ENCOUNTER FOR SCREENING FOR INFECTIONS WITH A PREDOMINANTLY SEXUAL MODE OF TRANSMISSION: ICD-10-CM

## 2023-07-25 DIAGNOSIS — Z13.29 ENCOUNTER FOR SCREENING FOR OTHER SUSPECTED ENDOCRINE DISORDER: ICD-10-CM

## 2023-07-25 DIAGNOSIS — Z01.84 ENCOUNTER FOR ANTIBODY RESPONSE EXAMINATION: ICD-10-CM

## 2023-07-25 DIAGNOSIS — R11.0 NAUSEA: ICD-10-CM

## 2023-07-25 DIAGNOSIS — Z13.21 ENCOUNTER FOR SCREENING FOR NUTRITIONAL DISORDER: ICD-10-CM

## 2023-07-25 DIAGNOSIS — Z13.0 ENCOUNTER FOR SCREENING FOR DISEASES OF THE BLOOD AND BLOOD-FORMING ORGANS AND CERTAIN DISORDERS INVOLVING THE IMMUNE MECHANISM: ICD-10-CM

## 2023-07-25 DIAGNOSIS — Z13.1 ENCOUNTER FOR SCREENING FOR DIABETES MELLITUS: ICD-10-CM

## 2023-07-25 DIAGNOSIS — Z36.9 ENCOUNTER FOR ANTENATAL SCREENING, UNSPECIFIED: ICD-10-CM

## 2023-07-25 DIAGNOSIS — Z13.88 ENCOUNTER FOR SCREENING FOR DISORDER DUE TO EXPOSURE TO CONTAMINANTS: ICD-10-CM

## 2023-07-25 PROCEDURE — 76817 TRANSVAGINAL US OBSTETRIC: CPT

## 2023-07-25 PROCEDURE — 36415 COLL VENOUS BLD VENIPUNCTURE: CPT

## 2023-07-25 PROCEDURE — 99214 OFFICE O/P EST MOD 30 MIN: CPT | Mod: 25

## 2023-07-25 NOTE — PHYSICAL EXAM
[Chaperone Declined] : Patient declined chaperone [Appropriately responsive] : appropriately responsive [Alert] : alert [No Acute Distress] : no acute distress [No Lymphadenopathy] : no lymphadenopathy [Soft] : soft [Non-tender] : non-tender [Non-distended] : non-distended [No HSM] : No HSM [No Lesions] : no lesions [No Mass] : no mass [Oriented x3] : oriented x3 [Examination Of The Breasts] : a normal appearance [No Masses] : no breast masses were palpable [Labia Majora] : normal [Labia Minora] : normal [Normal] : normal [Tenderness] : nontender [Enlarged ___ wks] : enlarged [unfilled] ~Uweeks [Uterine Adnexae] : normal

## 2023-07-25 NOTE — PLAN
[FreeTextEntry1] : Amenorrhea\par -TVUS today CRL 6w0d, no clear FHR today. LMP should be 7 weeks. Advised prt us in 2 weeks w/ [facility]/Dr. Gregorio. \par -Advised f/u cards, pulm and urology once pregnancy established. \par -+overlap carrier screening, s/p GC. For appt w/ [facility] to discuss possible invasive dx testing @ [place]. \par -continue PNVs\par -LDA @ 12 weeks\par -serial urine cx  to be done throughout pregnancy per [facility]. \par -NOB panel, ucx and gc collected and sent at todays visit. \par \par rto 2 weeks for NOB visit (appt w/ Dr. Gregorio)\par total appt time 35 minutes

## 2023-07-25 NOTE — PROCEDURE
[Transvaginal OB Sonogram] : Transvaginal OB Sonogram [Intrauterine Pregnancy] : intrauterine pregnancy [Yolk Sac] : yolk sac present [Fetal Heart] : no fetal heart [Current GA by Sonogram: ___ (wks)] : Current GA by Sonogram: [unfilled]Uwks [___ day(s)] : [unfilled] days [FreeTextEntry1] : CRL 6w0d, no clear FHR today. LMP should be 7 weeks. Advised prt us in 2 weeks w/ [facility]/Dr. Gregorio.

## 2023-07-25 NOTE — HISTORY OF PRESENT ILLNESS
[Patient reported PAP Smear was normal] : Patient reported PAP Smear was normal [FreeTextEntry1] : 29 y/o G0 LMP 6/6/2023 presents today with  for amenorrhea and reports positive home urine pregnancy tests. +UPT 2 weeks ago. Planned and desired pregnancy. -pelvic pain or vaginal bleeding. +nausea, no vomiting and reports increased thirst/ drymouth. \par \par MEDHX: recurrent kidney infections, asthma, questionable hx of pulmonary HTN\par SURGHX: kidney sx\par MEDS: PNVs\par ALL: NKDA, Gluten\par \par s/p GC Linda Mobilio 3/29/2023;both carriers for ppxd-1-rxntcmrhfkm deficincy so\par  offspring are at 25% risk to be affected.  Discussed\par  features of condition.  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 \par \par s/p preconception Consultation [facility] on 3/20/2023\par \par Employed as  @ [employer] David Enciso, sister is Genetic Counselor..  [PapSmeardate] : 03/2023 [LMPDate] : 06/06/23 [TextBox_6] : 6/6/23 [TextBox_13] : 28

## 2023-07-26 LAB
25(OH)D3 SERPL-MCNC: 33.4 NG/ML
C TRACH RRNA SPEC QL NAA+PROBE: NOT DETECTED
ESTIMATED AVERAGE GLUCOSE: 100 MG/DL
HBA1C MFR BLD HPLC: 5.1 %
HBV SURFACE AG SER QL: NONREACTIVE
HCV AB SER QL: NONREACTIVE
HCV S/CO RATIO: 0.09 S/CO
HGB A MFR BLD: 97.5 %
HGB A2 MFR BLD: 2.5 %
HGB FRACT BLD-IMP: NORMAL
HIV1+2 AB SPEC QL IA.RAPID: NONREACTIVE
MEV IGG FLD QL IA: 37.7 AU/ML
MEV IGG+IGM SER-IMP: POSITIVE
MUV AB SER-ACNC: POSITIVE
MUV IGG SER QL IA: 11.7 AU/ML
N GONORRHOEA RRNA SPEC QL NAA+PROBE: NOT DETECTED
RUBV IGG FLD-ACNC: 3.3 INDEX
RUBV IGG SER-IMP: POSITIVE
SOURCE AMPLIFICATION: NORMAL
T PALLIDUM AB SER QL IA: NEGATIVE
TSH SERPL-ACNC: 1.67 UIU/ML
VZV AB TITR SER: POSITIVE
VZV IGG SER IF-ACNC: 2080 INDEX

## 2023-07-31 DIAGNOSIS — N39.0 URINARY TRACT INFECTION, SITE NOT SPECIFIED: ICD-10-CM

## 2023-07-31 DIAGNOSIS — O36.0990 MATERNAL CARE FOR OTHER RHESUS ISOIMMUNIZATION, UNSPECIFIED TRIMESTER, NOT APPLICABLE OR UNSPECIFIED: ICD-10-CM

## 2023-07-31 LAB
ABO + RH PNL BLD: NORMAL
B19V IGG SER QL IA: 4.66 INDEX
B19V IGG+IGM SER-IMP: NORMAL
B19V IGG+IGM SER-IMP: POSITIVE
B19V IGM FLD-ACNC: 0.12 INDEX
B19V IGM SER-ACNC: NEGATIVE
BLD GP AB SCN SERPL QL: NORMAL
LEAD BLD-MCNC: <1 UG/DL

## 2023-07-31 RX ORDER — CEPHALEXIN 500 MG/1
500 CAPSULE ORAL TWICE DAILY
Qty: 10 | Refills: 0 | Status: ACTIVE | COMMUNITY
Start: 2023-07-31 | End: 1900-01-01

## 2023-08-01 RX ORDER — HUMAN RHO(D) IMMUNE GLOBULIN 300 UG/1
1500 INJECTION, SOLUTION INTRAMUSCULAR
Qty: 1 | Refills: 0 | Status: ACTIVE | COMMUNITY
Start: 2023-07-31 | End: 1900-01-01

## 2023-08-07 ENCOUNTER — APPOINTMENT (OUTPATIENT)
Dept: OBGYN | Facility: CLINIC | Age: 28
End: 2023-08-07
Payer: COMMERCIAL

## 2023-08-07 VITALS
BODY MASS INDEX: 21.68 KG/M2 | HEART RATE: 80 BPM | HEIGHT: 64 IN | DIASTOLIC BLOOD PRESSURE: 82 MMHG | SYSTOLIC BLOOD PRESSURE: 120 MMHG | WEIGHT: 127 LBS

## 2023-08-07 DIAGNOSIS — Z34.91 ENCOUNTER FOR SUPERVISION OF NORMAL PREGNANCY, UNSPECIFIED, FIRST TRIMESTER: ICD-10-CM

## 2023-08-07 DIAGNOSIS — Z14.8 GENETIC CARRIER OF OTHER DISEASE: ICD-10-CM

## 2023-08-07 PROCEDURE — 0500F INITIAL PRENATAL CARE VISIT: CPT

## 2023-08-09 LAB — BACTERIA UR CULT: NORMAL

## 2023-08-29 ENCOUNTER — NON-APPOINTMENT (OUTPATIENT)
Age: 28
End: 2023-08-29

## 2023-08-30 ENCOUNTER — APPOINTMENT (OUTPATIENT)
Dept: OBGYN | Facility: CLINIC | Age: 28
End: 2023-08-30
Payer: COMMERCIAL

## 2023-08-30 VITALS
SYSTOLIC BLOOD PRESSURE: 118 MMHG | WEIGHT: 129 LBS | DIASTOLIC BLOOD PRESSURE: 64 MMHG | HEIGHT: 64 IN | BODY MASS INDEX: 22.02 KG/M2

## 2023-08-30 PROCEDURE — 0502F SUBSEQUENT PRENATAL CARE: CPT

## 2023-09-03 ENCOUNTER — APPOINTMENT (OUTPATIENT)
Dept: INTERNAL MEDICINE | Facility: CLINIC | Age: 28
End: 2023-09-03
Payer: COMMERCIAL

## 2023-09-03 PROCEDURE — 99214 OFFICE O/P EST MOD 30 MIN: CPT | Mod: 95

## 2023-09-03 NOTE — PHYSICAL EXAM
[Normal Sclera/Conjunctiva] : normal sclera/conjunctiva [Normal Outer Ear/Nose] : the outer ears and nose were normal in appearance [No Respiratory Distress] : no respiratory distress  [Normal] : affect was normal and insight and judgment were intact

## 2023-09-05 ENCOUNTER — TRANSCRIPTION ENCOUNTER (OUTPATIENT)
Age: 28
End: 2023-09-05

## 2023-09-06 ENCOUNTER — ASOB RESULT (OUTPATIENT)
Age: 28
End: 2023-09-06

## 2023-09-06 ENCOUNTER — APPOINTMENT (OUTPATIENT)
Dept: MATERNAL FETAL MEDICINE | Facility: CLINIC | Age: 28
End: 2023-09-06
Payer: COMMERCIAL

## 2023-09-06 PROCEDURE — 99204 OFFICE O/P NEW MOD 45 MIN: CPT | Mod: 95

## 2023-09-08 ENCOUNTER — APPOINTMENT (OUTPATIENT)
Dept: MATERNAL FETAL MEDICINE | Facility: CLINIC | Age: 28
End: 2023-09-08

## 2023-09-08 ENCOUNTER — ASOB RESULT (OUTPATIENT)
Age: 28
End: 2023-09-08

## 2023-09-08 ENCOUNTER — LABORATORY RESULT (OUTPATIENT)
Age: 28
End: 2023-09-08

## 2023-09-08 ENCOUNTER — APPOINTMENT (OUTPATIENT)
Dept: ANTEPARTUM | Facility: CLINIC | Age: 28
End: 2023-09-08
Payer: COMMERCIAL

## 2023-09-08 ENCOUNTER — TRANSCRIPTION ENCOUNTER (OUTPATIENT)
Age: 28
End: 2023-09-08

## 2023-09-08 ENCOUNTER — NON-APPOINTMENT (OUTPATIENT)
Age: 28
End: 2023-09-08

## 2023-09-08 DIAGNOSIS — R39.9 UNSPECIFIED SYMPTOMS AND SIGNS INVOLVING THE GENITOURINARY SYSTEM: ICD-10-CM

## 2023-09-08 PROCEDURE — 59015 CHORION BIOPSY: CPT

## 2023-09-08 PROCEDURE — 76801 OB US < 14 WKS SINGLE FETUS: CPT

## 2023-09-08 PROCEDURE — 76813 OB US NUCHAL MEAS 1 GEST: CPT | Mod: 59

## 2023-09-08 PROCEDURE — 76945 ECHO GUIDE VILLUS SAMPLING: CPT

## 2023-09-08 NOTE — HISTORY OF PRESENT ILLNESS
[Home] : at home, [unfilled] , at the time of the visit. [Medical Office: ([place] campus)___] : at the medical office located in  [Verbal consent obtained from patient] : the patient, [unfilled] [FreeTextEntry8] : Ran out of [employer], is pregnant and wants to make sure that she is controlled.  No discrete exacerbations  -------------------------------------------------------------------------   10-minute telehealth encounter

## 2023-09-10 LAB — BACTERIA UR CULT: NORMAL

## 2023-09-11 ENCOUNTER — NON-APPOINTMENT (OUTPATIENT)
Age: 28
End: 2023-09-11

## 2023-09-11 ENCOUNTER — TRANSCRIPTION ENCOUNTER (OUTPATIENT)
Age: 28
End: 2023-09-11

## 2023-09-18 ENCOUNTER — NON-APPOINTMENT (OUTPATIENT)
Age: 28
End: 2023-09-18

## 2023-09-19 ENCOUNTER — NON-APPOINTMENT (OUTPATIENT)
Age: 28
End: 2023-09-19

## 2023-09-19 ENCOUNTER — APPOINTMENT (OUTPATIENT)
Dept: OBGYN | Facility: CLINIC | Age: 28
End: 2023-09-19
Payer: COMMERCIAL

## 2023-09-19 DIAGNOSIS — O20.9 HEMORRHAGE IN EARLY PREGNANCY, UNSPECIFIED: ICD-10-CM

## 2023-09-19 DIAGNOSIS — O09.91 SUPERVISION OF HIGH RISK PREGNANCY, UNSPECIFIED, FIRST TRIMESTER: ICD-10-CM

## 2023-09-19 PROCEDURE — 0502F SUBSEQUENT PRENATAL CARE: CPT

## 2023-09-21 LAB
BACTERIA UR CULT: NORMAL
CANDIDA VAG CYTO: NOT DETECTED
G VAGINALIS+PREV SP MTYP VAG QL MICRO: NOT DETECTED
T VAGINALIS VAG QL WET PREP: NOT DETECTED

## 2023-09-28 ENCOUNTER — APPOINTMENT (OUTPATIENT)
Dept: PULMONOLOGY | Facility: CLINIC | Age: 28
End: 2023-09-28
Payer: COMMERCIAL

## 2023-09-28 VITALS
OXYGEN SATURATION: 99 % | DIASTOLIC BLOOD PRESSURE: 70 MMHG | SYSTOLIC BLOOD PRESSURE: 120 MMHG | TEMPERATURE: 97.3 F | WEIGHT: 129 LBS | RESPIRATION RATE: 16 BRPM | HEART RATE: 70 BPM | HEIGHT: 64 IN | BODY MASS INDEX: 22.02 KG/M2

## 2023-09-28 DIAGNOSIS — K21.9 GASTRO-ESOPHAGEAL REFLUX DISEASE W/OUT ESOPHAGITIS: ICD-10-CM

## 2023-09-28 DIAGNOSIS — J45.909 UNSPECIFIED ASTHMA, UNCOMPLICATED: ICD-10-CM

## 2023-09-28 PROCEDURE — 94729 DIFFUSING CAPACITY: CPT

## 2023-09-28 PROCEDURE — 95012 NITRIC OXIDE EXP GAS DETER: CPT

## 2023-09-28 PROCEDURE — 94010 BREATHING CAPACITY TEST: CPT

## 2023-09-28 PROCEDURE — 99204 OFFICE O/P NEW MOD 45 MIN: CPT | Mod: 25

## 2023-09-28 PROCEDURE — 94618 PULMONARY STRESS TESTING: CPT

## 2023-09-28 PROCEDURE — 94727 GAS DIL/WSHOT DETER LNG VOL: CPT

## 2023-09-28 PROCEDURE — ZZZZZ: CPT

## 2023-10-09 ENCOUNTER — NON-APPOINTMENT (OUTPATIENT)
Age: 28
End: 2023-10-09

## 2023-10-09 ENCOUNTER — APPOINTMENT (OUTPATIENT)
Dept: OBGYN | Facility: CLINIC | Age: 28
End: 2023-10-09
Payer: COMMERCIAL

## 2023-10-09 PROCEDURE — 0502F SUBSEQUENT PRENATAL CARE: CPT

## 2023-10-09 PROCEDURE — G0008: CPT

## 2023-10-09 PROCEDURE — 90686 IIV4 VACC NO PRSV 0.5 ML IM: CPT

## 2023-10-09 PROCEDURE — 36415 COLL VENOUS BLD VENIPUNCTURE: CPT

## 2023-10-11 LAB — BACTERIA UR CULT: NORMAL

## 2023-10-12 LAB
AFP MOM: 1.61
AFP VALUE: 60.1 NG/ML
ALPHA FETOPROTEIN SERUM COMMENT: NORMAL
ALPHA FETOPROTEIN SERUM INTERPRETATION: NORMAL
ALPHA FETOPROTEIN SERUM RESULTS: NORMAL
ALPHA FETOPROTEIN SERUM TEST RESULTS: NORMAL
GESTATIONAL AGE BASED ON: NORMAL
GESTATIONAL AGE ON COLLECTION DATE: 16.4 WEEKS
INSULIN DEP DIABETES: NO
MATERNAL AGE AT EDD AFP: 30.1 YR
MULTIPLE GESTATION: NO
OSBR RISK 1 IN: 2047
RACE: NORMAL
WEIGHT AFP: 140 LBS

## 2023-11-03 ENCOUNTER — ASOB RESULT (OUTPATIENT)
Age: 28
End: 2023-11-03

## 2023-11-03 ENCOUNTER — APPOINTMENT (OUTPATIENT)
Dept: ANTEPARTUM | Facility: CLINIC | Age: 28
End: 2023-11-03
Payer: COMMERCIAL

## 2023-11-03 ENCOUNTER — NON-APPOINTMENT (OUTPATIENT)
Age: 28
End: 2023-11-03

## 2023-11-03 PROCEDURE — 76811 OB US DETAILED SNGL FETUS: CPT

## 2023-11-13 ENCOUNTER — NON-APPOINTMENT (OUTPATIENT)
Age: 28
End: 2023-11-13

## 2023-11-13 ENCOUNTER — APPOINTMENT (OUTPATIENT)
Dept: OBGYN | Facility: CLINIC | Age: 28
End: 2023-11-13
Payer: COMMERCIAL

## 2023-11-13 PROCEDURE — 0502F SUBSEQUENT PRENATAL CARE: CPT

## 2023-11-20 ENCOUNTER — NON-APPOINTMENT (OUTPATIENT)
Age: 28
End: 2023-11-20

## 2023-11-20 DIAGNOSIS — N39.0 URINARY TRACT INFECTION, SITE NOT SPECIFIED: ICD-10-CM

## 2023-11-27 ENCOUNTER — APPOINTMENT (OUTPATIENT)
Dept: PULMONOLOGY | Facility: CLINIC | Age: 28
End: 2023-11-27
Payer: COMMERCIAL

## 2023-11-27 VITALS
RESPIRATION RATE: 16 BRPM | OXYGEN SATURATION: 99 % | BODY MASS INDEX: 23.05 KG/M2 | DIASTOLIC BLOOD PRESSURE: 62 MMHG | HEART RATE: 90 BPM | TEMPERATURE: 97.2 F | WEIGHT: 135 LBS | HEIGHT: 64 IN | SYSTOLIC BLOOD PRESSURE: 120 MMHG

## 2023-11-27 PROCEDURE — 95012 NITRIC OXIDE EXP GAS DETER: CPT

## 2023-11-27 PROCEDURE — 99214 OFFICE O/P EST MOD 30 MIN: CPT | Mod: 25

## 2023-11-27 PROCEDURE — 94010 BREATHING CAPACITY TEST: CPT

## 2023-12-04 ENCOUNTER — OUTPATIENT (OUTPATIENT)
Dept: OUTPATIENT SERVICES | Facility: HOSPITAL | Age: 28
LOS: 1 days | End: 2023-12-04

## 2023-12-04 ENCOUNTER — APPOINTMENT (OUTPATIENT)
Dept: INTERNAL MEDICINE | Facility: CLINIC | Age: 28
End: 2023-12-04

## 2023-12-18 ENCOUNTER — APPOINTMENT (OUTPATIENT)
Dept: OBGYN | Facility: CLINIC | Age: 28
End: 2023-12-18
Payer: COMMERCIAL

## 2023-12-18 ENCOUNTER — LABORATORY RESULT (OUTPATIENT)
Age: 28
End: 2023-12-18

## 2023-12-18 ENCOUNTER — ASOB RESULT (OUTPATIENT)
Age: 28
End: 2023-12-18

## 2023-12-18 ENCOUNTER — NON-APPOINTMENT (OUTPATIENT)
Age: 28
End: 2023-12-18

## 2023-12-18 DIAGNOSIS — J45.909 DISEASES OF THE RESPIRATORY SYSTEM COMPLICATING PREGNANCY, SECOND TRIMESTER: ICD-10-CM

## 2023-12-18 DIAGNOSIS — O09.92 SUPERVISION OF HIGH RISK PREGNANCY, UNSPECIFIED, SECOND TRIMESTER: ICD-10-CM

## 2023-12-18 DIAGNOSIS — O99.512 DISEASES OF THE RESPIRATORY SYSTEM COMPLICATING PREGNANCY, SECOND TRIMESTER: ICD-10-CM

## 2023-12-18 PROCEDURE — 59425 ANTEPARTUM CARE ONLY: CPT

## 2023-12-18 PROCEDURE — 0502F SUBSEQUENT PRENATAL CARE: CPT

## 2023-12-18 PROCEDURE — 76816 OB US FOLLOW-UP PER FETUS: CPT

## 2023-12-18 PROCEDURE — 36415 COLL VENOUS BLD VENIPUNCTURE: CPT

## 2023-12-18 PROCEDURE — 76817 TRANSVAGINAL US OBSTETRIC: CPT

## 2023-12-19 ENCOUNTER — OUTPATIENT (OUTPATIENT)
Dept: OUTPATIENT SERVICES | Facility: HOSPITAL | Age: 28
LOS: 1 days | End: 2023-12-19
Payer: COMMERCIAL

## 2023-12-19 VITALS — TEMPERATURE: 98 F | RESPIRATION RATE: 18 BRPM

## 2023-12-19 DIAGNOSIS — O26.899 OTHER SPECIFIED PREGNANCY RELATED CONDITIONS, UNSPECIFIED TRIMESTER: ICD-10-CM

## 2023-12-19 DIAGNOSIS — Z98.890 OTHER SPECIFIED POSTPROCEDURAL STATES: Chronic | ICD-10-CM

## 2023-12-19 LAB
25(OH)D3 SERPL-MCNC: 45.8 NG/ML
APPEARANCE UR: CLEAR — SIGNIFICANT CHANGE UP
APPEARANCE UR: CLEAR — SIGNIFICANT CHANGE UP
BASOPHILS # BLD AUTO: 0.05 K/UL
BASOPHILS NFR BLD AUTO: 0.4 %
BILIRUB UR-MCNC: NEGATIVE — SIGNIFICANT CHANGE UP
BILIRUB UR-MCNC: NEGATIVE — SIGNIFICANT CHANGE UP
BLD GP AB SCN SERPL QL: NORMAL
COLOR SPEC: YELLOW — SIGNIFICANT CHANGE UP
COLOR SPEC: YELLOW — SIGNIFICANT CHANGE UP
DIFF PNL FLD: NEGATIVE — SIGNIFICANT CHANGE UP
DIFF PNL FLD: NEGATIVE — SIGNIFICANT CHANGE UP
EOSINOPHIL # BLD AUTO: 0.09 K/UL
EOSINOPHIL NFR BLD AUTO: 0.8 %
GLUCOSE 1H P 50 G GLC PO SERPL-MCNC: 80 MG/DL
GLUCOSE UR QL: NEGATIVE MG/DL — SIGNIFICANT CHANGE UP
GLUCOSE UR QL: NEGATIVE MG/DL — SIGNIFICANT CHANGE UP
HCT VFR BLD CALC: 36.9 %
HGB BLD-MCNC: 12.2 G/DL
HIV1+2 AB SPEC QL IA.RAPID: NONREACTIVE
IMM GRANULOCYTES NFR BLD AUTO: 2.1 %
KETONES UR-MCNC: NEGATIVE MG/DL — SIGNIFICANT CHANGE UP
KETONES UR-MCNC: NEGATIVE MG/DL — SIGNIFICANT CHANGE UP
LEUKOCYTE ESTERASE UR-ACNC: ABNORMAL
LEUKOCYTE ESTERASE UR-ACNC: ABNORMAL
LYMPHOCYTES # BLD AUTO: 1.89 K/UL
LYMPHOCYTES NFR BLD AUTO: 16.9 %
MAN DIFF?: NORMAL
MCHC RBC-ENTMCNC: 31 PG
MCHC RBC-ENTMCNC: 33.1 GM/DL
MCV RBC AUTO: 93.7 FL
MONOCYTES # BLD AUTO: 0.86 K/UL
MONOCYTES NFR BLD AUTO: 7.7 %
NEUTROPHILS # BLD AUTO: 8.07 K/UL
NEUTROPHILS NFR BLD AUTO: 72.1 %
NITRITE UR-MCNC: NEGATIVE — SIGNIFICANT CHANGE UP
NITRITE UR-MCNC: NEGATIVE — SIGNIFICANT CHANGE UP
PH UR: 8 — SIGNIFICANT CHANGE UP (ref 5–8)
PH UR: 8 — SIGNIFICANT CHANGE UP (ref 5–8)
PLATELET # BLD AUTO: 271 K/UL
PROT UR-MCNC: NEGATIVE MG/DL — SIGNIFICANT CHANGE UP
PROT UR-MCNC: NEGATIVE MG/DL — SIGNIFICANT CHANGE UP
RBC # BLD: 3.94 M/UL
RBC # FLD: 13.2 %
SP GR SPEC: 1 — SIGNIFICANT CHANGE UP (ref 1–1.03)
SP GR SPEC: 1 — SIGNIFICANT CHANGE UP (ref 1–1.03)
T PALLIDUM AB SER QL IA: NEGATIVE
UROBILINOGEN FLD QL: 0.2 MG/DL — SIGNIFICANT CHANGE UP (ref 0.2–1)
UROBILINOGEN FLD QL: 0.2 MG/DL — SIGNIFICANT CHANGE UP (ref 0.2–1)
WBC # FLD AUTO: 11.19 K/UL

## 2023-12-19 PROCEDURE — G0463: CPT

## 2023-12-19 PROCEDURE — 81001 URINALYSIS AUTO W/SCOPE: CPT

## 2023-12-19 PROCEDURE — 87086 URINE CULTURE/COLONY COUNT: CPT

## 2023-12-19 PROCEDURE — 83986 ASSAY PH BODY FLUID NOS: CPT

## 2023-12-19 RX ORDER — ACETAMINOPHEN 500 MG
975 TABLET ORAL ONCE
Refills: 0 | Status: COMPLETED | OUTPATIENT
Start: 2023-12-19 | End: 2023-12-19

## 2023-12-19 RX ADMIN — Medication 975 MILLIGRAM(S): at 23:25

## 2023-12-19 NOTE — OB PROVIDER TRIAGE NOTE - NSOBPROVIDERNOTE_OBGYN_ALL_OB_FT
Assessment  28yoF   @26w4d  presents for pelvic cramping, back pain, rule out  labor. Cervix appears long on speculum and TVUS. [place] quiet, no ctx noted. Abdominal exam benign with no tenderness. Fetal status reassuring.  Low concern for  labor at this time.     Plan  -UA  -PO hydration  -Tylenol for cramping/back pain  - cont to observe    ***  - Discharge to home  - Pt has follow up appointment scheduled for   - Return precautions given, including loss of fluid, vaginal bleeding, decreased fetal movement, painful contractions occurring q3-5min regularly for 1 hour. Pt expressed understanding. All questions answered.  ***    D/w Dr. Jg Craig-Montes PGY3 Assessment  28yoF   @26w4d  presents for pelvic cramping, back pain, rule out  labor. Cervix appears long on speculum and TVUS. [place] quiet, no ctx noted. Abdominal exam benign with no tenderness. Fetal status reassuring.  Low concern for  labor at this time.     Plan  -UA  -PO hydration  -Tylenol for cramping/back pain  - cont to observe    ***  - Discharge to home  - Pt has follow up appointment scheduled for   - Return precautions given, including loss of fluid, vaginal bleeding, decreased fetal movement, painful contractions occurring q3-5min regularly for 1 hour. Pt expressed understanding. All questions answered.  ***    D/w Dr. Jg Craig-Montes PGY3

## 2023-12-19 NOTE — OB RN TRIAGE NOTE - FALL HARM RISK - UNIVERSAL INTERVENTIONS
Bed in lowest position, wheels locked, appropriate side rails in place/Call bell, personal items and telephone in reach/Instruct patient to call for assistance before getting out of bed or chair/Non-slip footwear when patient is out of bed/[place] to call system/Physically safe environment - no spills, clutter or unnecessary equipment/Purposeful Proactive Rounding/Room/bathroom lighting operational, light cord in reach Bed in lowest position, wheels locked, appropriate side rails in place/Call bell, personal items and telephone in reach/Instruct patient to call for assistance before getting out of bed or chair/Non-slip footwear when patient is out of bed/[place] to call system/Physically safe environment - no spills, clutter or unnecessary equipment/Purposeful Proactive Rounding/Room/bathroom lighting operational, light cord in reach

## 2023-12-19 NOTE — OB PROVIDER TRIAGE NOTE - HISTORY OF PRESENT ILLNESS
R3 Triage H&P    JEAN CARLOS AGUIRRE  [id number]    Subjective  HPI: 29yo F  @36w4d presents for cramping pelvic pain and intermittent back pain since 630pm. Pt reports that she ate tacos for dinner and afterwards she began to feel irregular cramping pain q15 minutes. Reports it is 5/10 but "not very painful". Also reporting back "aching" with walking on the way to triage. She took a tums without relief. Reports a brief episode of nausea which resolved.    +FM   -LOF   -CTXs   -VB. Pt denies any other concerns.    ATU(11/3): variable, anterior low lying placenta, 298g, cervix 4.48cm    PNC: Denies prenatal issues.   ObHx: none  GynHx: Denies hx of fibroids, ovarian cysts, abnml PAP smears, STIs  MedHx: Denies hx of HTN, DM, asthma, thyroid problems, blood clots/bleeding problems, hx of blood transfusions  Meds: PNV  All: NKDA  PSHx: Denies  FHx: Denies hx of blood clots/bleeding problems  Social: Denies alcohol/tobacco/drug use in pregnancy  Psych: Denies hx of anxiety/depression     – Will accept blood transfusions? Yes      Objective  – VS  T(C): 36.9 (23 @ 22:16)  HR: 76 (23 @ 23:03)  BP: 111/63 (23 @ 22:16)  RR: 18 (23 @ 22:16)  SpO2: 98% (23 @ 23:03)    – PE:   CV: RRR  Pulm: breathing comfortably on RA  Abd: gravid, nontender  Extr: moving all extremities with ease    – Spec: neg pooling, neg nitrazine, no bleeding, cervix appears long, external os appears 0.5cm dilated    – FHT: baseline 140, mod variability, +accels, -decels  – [place]: no ctx noted    – Sono: breech, +grossly normal movement, MVP 4.9  TVUS: CL >4.5cm     R3 Triage H&P    JEAN CARLOS AGUIRRE  [id number]    Subjective  HPI: 27yo F  @36w4d presents for cramping pelvic pain and intermittent back pain since 630pm. Pt reports that she ate tacos for dinner and afterwards she began to feel irregular cramping pain q15 minutes. Reports it is 5/10 but "not very painful". Also reporting back "aching" with walking on the way to triage. She took a tums without relief. Reports a brief episode of nausea which resolved.    +FM   -LOF   -CTXs   -VB. Pt denies any other concerns.    ATU(11/3): variable, anterior low lying placenta, 298g, cervix 4.48cm    PNC: Denies prenatal issues.   ObHx: none  GynHx: Denies hx of fibroids, ovarian cysts, abnml PAP smears, STIs  MedHx: Denies hx of HTN, DM, asthma, thyroid problems, blood clots/bleeding problems, hx of blood transfusions  Meds: PNV  All: NKDA  PSHx: Denies  FHx: Denies hx of blood clots/bleeding problems  Social: Denies alcohol/tobacco/drug use in pregnancy  Psych: Denies hx of anxiety/depression     – Will accept blood transfusions? Yes      Objective  – VS  T(C): 36.9 (23 @ 22:16)  HR: 76 (23 @ 23:03)  BP: 111/63 (23 @ 22:16)  RR: 18 (23 @ 22:16)  SpO2: 98% (23 @ 23:03)    – PE:   CV: RRR  Pulm: breathing comfortably on RA  Abd: gravid, nontender  Extr: moving all extremities with ease    – Spec: neg pooling, neg nitrazine, no bleeding, cervix appears long, external os appears 0.5cm dilated    – FHT: baseline 140, mod variability, +accels, -decels  – [place]: no ctx noted    – Sono: breech, +grossly normal movement, MVP 4.9  TVUS: CL >4.5cm     R3 Triage H&P    JEAN CARLOS AGUIRRE  [id number]    Subjective  HPI: 27yo F  @36w4d presents for cramping pelvic pain and intermittent back pain since 630pm. Pt reports that she ate tacos for dinner and afterwards she began to feel irregular cramping pain q15 minutes. Reports it is 5/10 but "not very painful". Also reporting back "aching" with walking on the way to triage. She took a tums without relief. Reports a brief episode of nausea which resolved.    +FM   -LOF   -CTXs   -VB. Pt denies any other concerns.    ATU(11/3): variable, anterior low lying placenta, 298g, cervix 4.48cm    PNC:   - s/p CVS (WNL)  - low lying placenta, for repeat MFM sono @28w  - E Coli Uti s/p Keflex x5 days in first trimester, repeat UCx was negative  ObHx: G1  GynHx: Denies hx of fibroids, ovarian cysts, abnml PAP smears, STIs  MedHx: asthma (pulm = Dr Gutierrez), recurrent UTIs s/p R UPJ reflux surgery (), ?pulm HTN s/p normal TTE 2023  Meds: PNV, Spiriva, Symbicort BID  All: NKDA  PSHx:  UPJ reflux surgery ()  Social: Denies alcohol/tobacco/drug use in pregnancy  Psych: Denies hx of anxiety/depression     – Will accept blood transfusions? Yes      Objective  – VS  T(C): 36.9 (23 @ 22:16)  HR: 76 (23 @ 23:03)  BP: 111/63 (23 @ 22:16)  RR: 18 (23 @ 22:16)  SpO2: 98% (23 @ 23:03)    – PE:   CV: RRR  Pulm: breathing comfortably on RA  Abd: gravid, nontender  Extr: moving all extremities with ease    – Spec: neg pooling, neg nitrazine, no bleeding, cervix appears long, external os appears 0.5cm dilated    – FHT: baseline 140, mod variability, +accels, -decels  – [place]: no ctx noted    – Sono: breech, +grossly normal movement, MVP 4.9  TVUS: CL >4.5cm     R3 Triage H&P    JEAN CARLOS AGUIRRE  [id number]    Subjective  HPI: 29yo F  @36w4d presents for cramping pelvic pain and intermittent back pain since 630pm. Pt reports that she ate tacos for dinner and afterwards she began to feel irregular cramping pain q15 minutes. Reports it is 5/10 but "not very painful". Also reporting back "aching" with walking on the way to triage. She took a tums without relief. Reports a brief episode of nausea which resolved.    +FM   -LOF   -CTXs   -VB. Pt denies any other concerns.    ATU(11/3): variable, anterior low lying placenta, 298g, cervix 4.48cm    PNC:   - s/p CVS (WNL)  - low lying placenta, for repeat MFM sono @28w  - E Coli Uti s/p Keflex x5 days in first trimester, repeat UCx was negative  ObHx: G1  GynHx: Denies hx of fibroids, ovarian cysts, abnml PAP smears, STIs  MedHx: asthma (pulm = Dr Gutierrez), recurrent UTIs s/p R UPJ reflux surgery (), ?pulm HTN s/p normal TTE 2023  Meds: PNV, Spiriva, Symbicort BID  All: NKDA  PSHx:  UPJ reflux surgery ()  Social: Denies alcohol/tobacco/drug use in pregnancy  Psych: Denies hx of anxiety/depression     – Will accept blood transfusions? Yes      Objective  – VS  T(C): 36.9 (23 @ 22:16)  HR: 76 (23 @ 23:03)  BP: 111/63 (23 @ 22:16)  RR: 18 (23 @ 22:16)  SpO2: 98% (23 @ 23:03)    – PE:   CV: RRR  Pulm: breathing comfortably on RA  Abd: gravid, nontender  Extr: moving all extremities with ease    – Spec: neg pooling, neg nitrazine, no bleeding, cervix appears long, external os appears 0.5cm dilated    – FHT: baseline 140, mod variability, +accels, -decels  – [place]: no ctx noted    – Sono: breech, +grossly normal movement, MVP 4.9  TVUS: CL >4.5cm

## 2023-12-20 ENCOUNTER — NON-APPOINTMENT (OUTPATIENT)
Age: 28
End: 2023-12-20

## 2023-12-20 VITALS — HEART RATE: 73 BPM | SYSTOLIC BLOOD PRESSURE: 119 MMHG | DIASTOLIC BLOOD PRESSURE: 67 MMHG

## 2023-12-20 LAB
BACTERIA # UR AUTO: ABNORMAL /HPF
BACTERIA # UR AUTO: ABNORMAL /HPF
BACTERIA UR CULT: NORMAL
CAST: 3 /LPF — SIGNIFICANT CHANGE UP (ref 0–4)
CAST: 3 /LPF — SIGNIFICANT CHANGE UP (ref 0–4)
RBC CASTS # UR COMP ASSIST: 1 /HPF — SIGNIFICANT CHANGE UP (ref 0–4)
RBC CASTS # UR COMP ASSIST: 1 /HPF — SIGNIFICANT CHANGE UP (ref 0–4)
REVIEW: SIGNIFICANT CHANGE UP
REVIEW: SIGNIFICANT CHANGE UP
SQUAMOUS # UR AUTO: 3 /HPF — SIGNIFICANT CHANGE UP (ref 0–5)
SQUAMOUS # UR AUTO: 3 /HPF — SIGNIFICANT CHANGE UP (ref 0–5)
WBC UR QL: 2 /HPF — SIGNIFICANT CHANGE UP (ref 0–5)
WBC UR QL: 2 /HPF — SIGNIFICANT CHANGE UP (ref 0–5)

## 2023-12-21 ENCOUNTER — TRANSCRIPTION ENCOUNTER (OUTPATIENT)
Age: 28
End: 2023-12-21

## 2023-12-21 LAB
CULTURE RESULTS: SIGNIFICANT CHANGE UP
CULTURE RESULTS: SIGNIFICANT CHANGE UP
SPECIMEN SOURCE: SIGNIFICANT CHANGE UP
SPECIMEN SOURCE: SIGNIFICANT CHANGE UP

## 2023-12-22 DIAGNOSIS — O26.892 OTHER SPECIFIED PREGNANCY RELATED CONDITIONS, SECOND TRIMESTER: ICD-10-CM

## 2023-12-22 DIAGNOSIS — O44.42 LOW LYING PLACENTA NOS OR WITHOUT HEMORRHAGE, SECOND TRIMESTER: ICD-10-CM

## 2023-12-22 DIAGNOSIS — Z3A.26 26 WEEKS GESTATION OF PREGNANCY: ICD-10-CM

## 2023-12-22 DIAGNOSIS — J45.909 UNSPECIFIED ASTHMA, UNCOMPLICATED: ICD-10-CM

## 2023-12-22 DIAGNOSIS — R10.2 PELVIC AND PERINEAL PAIN: ICD-10-CM

## 2023-12-22 DIAGNOSIS — O99.513 DISEASES OF THE RESPIRATORY SYSTEM COMPLICATING PREGNANCY, THIRD TRIMESTER: ICD-10-CM

## 2023-12-22 DIAGNOSIS — O99.891 OTHER SPECIFIED DISEASES AND CONDITIONS COMPLICATING PREGNANCY: ICD-10-CM

## 2023-12-22 DIAGNOSIS — M54.9 DORSALGIA, UNSPECIFIED: ICD-10-CM

## 2024-01-01 PROBLEM — Z78.9 OTHER SPECIFIED HEALTH STATUS: Chronic | Status: INACTIVE | Noted: 2022-07-11 | Resolved: 2023-12-19

## 2024-01-03 ENCOUNTER — ASOB RESULT (OUTPATIENT)
Age: 29
End: 2024-01-03

## 2024-01-03 ENCOUNTER — APPOINTMENT (OUTPATIENT)
Dept: OBGYN | Facility: CLINIC | Age: 29
End: 2024-01-03
Payer: COMMERCIAL

## 2024-01-03 ENCOUNTER — NON-APPOINTMENT (OUTPATIENT)
Age: 29
End: 2024-01-03

## 2024-01-03 DIAGNOSIS — Z23 ENCOUNTER FOR IMMUNIZATION: ICD-10-CM

## 2024-01-03 DIAGNOSIS — Z31.82 ENCOUNTER FOR RH INCOMPATIBILITY STATUS: ICD-10-CM

## 2024-01-03 PROCEDURE — 76816 OB US FOLLOW-UP PER FETUS: CPT

## 2024-01-03 PROCEDURE — 90715 TDAP VACCINE 7 YRS/> IM: CPT

## 2024-01-03 PROCEDURE — 76817 TRANSVAGINAL US OBSTETRIC: CPT

## 2024-01-03 PROCEDURE — 96372 THER/PROPH/DIAG INJ SC/IM: CPT

## 2024-01-03 PROCEDURE — 0502F SUBSEQUENT PRENATAL CARE: CPT

## 2024-01-03 PROCEDURE — 90471 IMMUNIZATION ADMIN: CPT

## 2024-01-03 RX ORDER — HUMAN RHO(D) IMMUNE GLOBULIN 300 UG/1
1500 INJECTION, SOLUTION INTRAMUSCULAR
Refills: 0 | Status: COMPLETED | OUTPATIENT
Start: 2024-01-03

## 2024-01-03 RX ADMIN — HUMAN RHO(D) IMMUNE GLOBULIN 0 UNIT: 300 INJECTION, SOLUTION INTRAMUSCULAR at 00:00

## 2024-01-09 ENCOUNTER — NON-APPOINTMENT (OUTPATIENT)
Age: 29
End: 2024-01-09

## 2024-01-09 ENCOUNTER — TRANSCRIPTION ENCOUNTER (OUTPATIENT)
Age: 29
End: 2024-01-09

## 2024-01-17 ENCOUNTER — NON-APPOINTMENT (OUTPATIENT)
Age: 29
End: 2024-01-17

## 2024-01-17 ENCOUNTER — APPOINTMENT (OUTPATIENT)
Dept: OBGYN | Facility: CLINIC | Age: 29
End: 2024-01-17
Payer: COMMERCIAL

## 2024-01-17 DIAGNOSIS — O23.599 INFECTION OF OTHER PART OF GENITAL TRACT IN PREGNANCY, UNSPECIFIED TRIMESTER: ICD-10-CM

## 2024-01-17 DIAGNOSIS — B96.89 INFECTION OF OTHER PART OF GENITAL TRACT IN PREGNANCY, UNSPECIFIED TRIMESTER: ICD-10-CM

## 2024-01-17 DIAGNOSIS — N76.0 ACUTE VAGINITIS: ICD-10-CM

## 2024-01-17 DIAGNOSIS — B96.89 ACUTE VAGINITIS: ICD-10-CM

## 2024-01-17 PROCEDURE — 0502F SUBSEQUENT PRENATAL CARE: CPT

## 2024-01-19 LAB — BACTERIA UR CULT: NORMAL

## 2024-01-29 ENCOUNTER — NON-APPOINTMENT (OUTPATIENT)
Age: 29
End: 2024-01-29

## 2024-01-29 ENCOUNTER — ASOB RESULT (OUTPATIENT)
Age: 29
End: 2024-01-29

## 2024-01-29 ENCOUNTER — APPOINTMENT (OUTPATIENT)
Dept: OBGYN | Facility: CLINIC | Age: 29
End: 2024-01-29
Payer: COMMERCIAL

## 2024-01-29 VITALS
SYSTOLIC BLOOD PRESSURE: 112 MMHG | HEIGHT: 64 IN | DIASTOLIC BLOOD PRESSURE: 75 MMHG | WEIGHT: 153 LBS | BODY MASS INDEX: 26.12 KG/M2

## 2024-01-29 PROCEDURE — 0502F SUBSEQUENT PRENATAL CARE: CPT

## 2024-01-29 PROCEDURE — 76819 FETAL BIOPHYS PROFIL W/O NST: CPT | Mod: 59

## 2024-01-29 PROCEDURE — 76816 OB US FOLLOW-UP PER FETUS: CPT

## 2024-01-30 PROBLEM — J45.909 UNSPECIFIED ASTHMA, UNCOMPLICATED: Chronic | Status: ACTIVE | Noted: 2023-12-19

## 2024-01-30 PROBLEM — N15.9 RENAL TUBULO-INTERSTITIAL DISEASE, UNSPECIFIED: Chronic | Status: ACTIVE | Noted: 2023-12-19

## 2024-02-12 ENCOUNTER — NON-APPOINTMENT (OUTPATIENT)
Age: 29
End: 2024-02-12

## 2024-02-12 ENCOUNTER — APPOINTMENT (OUTPATIENT)
Dept: OBGYN | Facility: CLINIC | Age: 29
End: 2024-02-12

## 2024-02-12 ENCOUNTER — APPOINTMENT (OUTPATIENT)
Dept: OBGYN | Facility: CLINIC | Age: 29
End: 2024-02-12
Payer: COMMERCIAL

## 2024-02-12 PROCEDURE — 0502F SUBSEQUENT PRENATAL CARE: CPT

## 2024-02-20 ENCOUNTER — NON-APPOINTMENT (OUTPATIENT)
Age: 29
End: 2024-02-20

## 2024-02-20 ENCOUNTER — APPOINTMENT (OUTPATIENT)
Dept: OBGYN | Facility: CLINIC | Age: 29
End: 2024-02-20
Payer: COMMERCIAL

## 2024-02-20 PROCEDURE — 0502F SUBSEQUENT PRENATAL CARE: CPT

## 2024-02-23 ENCOUNTER — TRANSCRIPTION ENCOUNTER (OUTPATIENT)
Age: 29
End: 2024-02-23

## 2024-02-25 ENCOUNTER — OUTPATIENT (OUTPATIENT)
Dept: OUTPATIENT SERVICES | Facility: HOSPITAL | Age: 29
LOS: 1 days | End: 2024-02-25
Payer: COMMERCIAL

## 2024-02-25 VITALS
RESPIRATION RATE: 18 BRPM | SYSTOLIC BLOOD PRESSURE: 125 MMHG | DIASTOLIC BLOOD PRESSURE: 70 MMHG | HEART RATE: 113 BPM | TEMPERATURE: 98 F | OXYGEN SATURATION: 96 %

## 2024-02-25 VITALS
DIASTOLIC BLOOD PRESSURE: 61 MMHG | TEMPERATURE: 99 F | SYSTOLIC BLOOD PRESSURE: 122 MMHG | HEART RATE: 119 BPM | OXYGEN SATURATION: 97 %

## 2024-02-25 DIAGNOSIS — Z98.890 OTHER SPECIFIED POSTPROCEDURAL STATES: Chronic | ICD-10-CM

## 2024-02-25 DIAGNOSIS — O26.899 OTHER SPECIFIED PREGNANCY RELATED CONDITIONS, UNSPECIFIED TRIMESTER: ICD-10-CM

## 2024-02-25 LAB
ALBUMIN SERPL ELPH-MCNC: 3.7 G/DL — SIGNIFICANT CHANGE UP (ref 3.3–5)
ALP SERPL-CCNC: 144 U/L — HIGH (ref 40–120)
ALT FLD-CCNC: 18 U/L — SIGNIFICANT CHANGE UP (ref 10–45)
ANION GAP SERPL CALC-SCNC: 13 MMOL/L — SIGNIFICANT CHANGE UP (ref 5–17)
APPEARANCE UR: CLEAR — SIGNIFICANT CHANGE UP
AST SERPL-CCNC: 20 U/L — SIGNIFICANT CHANGE UP (ref 10–40)
BASOPHILS # BLD AUTO: 0 K/UL — SIGNIFICANT CHANGE UP (ref 0–0.2)
BASOPHILS NFR BLD AUTO: 0 % — SIGNIFICANT CHANGE UP (ref 0–2)
BILIRUB SERPL-MCNC: 0.7 MG/DL — SIGNIFICANT CHANGE UP (ref 0.2–1.2)
BILIRUB UR-MCNC: NEGATIVE — SIGNIFICANT CHANGE UP
BUN SERPL-MCNC: 7 MG/DL — SIGNIFICANT CHANGE UP (ref 7–23)
CALCIUM SERPL-MCNC: 9.1 MG/DL — SIGNIFICANT CHANGE UP (ref 8.4–10.5)
CHLORIDE SERPL-SCNC: 103 MMOL/L — SIGNIFICANT CHANGE UP (ref 96–108)
CO2 SERPL-SCNC: 19 MMOL/L — LOW (ref 22–31)
COLOR SPEC: YELLOW — SIGNIFICANT CHANGE UP
CREAT SERPL-MCNC: 0.63 MG/DL — SIGNIFICANT CHANGE UP (ref 0.5–1.3)
DIFF PNL FLD: NEGATIVE — SIGNIFICANT CHANGE UP
EGFR: 122 ML/MIN/1.73M2 — SIGNIFICANT CHANGE UP
EOSINOPHIL # BLD AUTO: 0 K/UL — SIGNIFICANT CHANGE UP (ref 0–0.5)
EOSINOPHIL NFR BLD AUTO: 0 % — SIGNIFICANT CHANGE UP (ref 0–6)
GLUCOSE SERPL-MCNC: 78 MG/DL — SIGNIFICANT CHANGE UP (ref 70–99)
GLUCOSE UR QL: NEGATIVE MG/DL — SIGNIFICANT CHANGE UP
HCT VFR BLD CALC: 38.1 % — SIGNIFICANT CHANGE UP (ref 34.5–45)
HGB BLD-MCNC: 12.9 G/DL — SIGNIFICANT CHANGE UP (ref 11.5–15.5)
KETONES UR-MCNC: 40 MG/DL
LEUKOCYTE ESTERASE UR-ACNC: ABNORMAL
LYMPHOCYTES # BLD AUTO: 0.56 K/UL — LOW (ref 1–3.3)
LYMPHOCYTES # BLD AUTO: 4.3 % — LOW (ref 13–44)
MCHC RBC-ENTMCNC: 31 PG — SIGNIFICANT CHANGE UP (ref 27–34)
MCHC RBC-ENTMCNC: 33.9 GM/DL — SIGNIFICANT CHANGE UP (ref 32–36)
MCV RBC AUTO: 91.6 FL — SIGNIFICANT CHANGE UP (ref 80–100)
MONOCYTES # BLD AUTO: 0.79 K/UL — SIGNIFICANT CHANGE UP (ref 0–0.9)
MONOCYTES NFR BLD AUTO: 6.1 % — SIGNIFICANT CHANGE UP (ref 2–14)
NEUTROPHILS # BLD AUTO: 11.42 K/UL — HIGH (ref 1.8–7.4)
NEUTROPHILS NFR BLD AUTO: 86.2 % — HIGH (ref 43–77)
NITRITE UR-MCNC: NEGATIVE — SIGNIFICANT CHANGE UP
PH UR: 6.5 — SIGNIFICANT CHANGE UP (ref 5–8)
PLATELET # BLD AUTO: 220 K/UL — SIGNIFICANT CHANGE UP (ref 150–400)
POTASSIUM SERPL-MCNC: 3.8 MMOL/L — SIGNIFICANT CHANGE UP (ref 3.5–5.3)
POTASSIUM SERPL-SCNC: 3.8 MMOL/L — SIGNIFICANT CHANGE UP (ref 3.5–5.3)
PROT SERPL-MCNC: 6.7 G/DL — SIGNIFICANT CHANGE UP (ref 6–8.3)
PROT UR-MCNC: NEGATIVE MG/DL — SIGNIFICANT CHANGE UP
RBC # BLD: 4.16 M/UL — SIGNIFICANT CHANGE UP (ref 3.8–5.2)
RBC # FLD: 13.2 % — SIGNIFICANT CHANGE UP (ref 10.3–14.5)
SODIUM SERPL-SCNC: 135 MMOL/L — SIGNIFICANT CHANGE UP (ref 135–145)
SP GR SPEC: 1.01 — SIGNIFICANT CHANGE UP (ref 1–1.03)
UROBILINOGEN FLD QL: 0.2 MG/DL — SIGNIFICANT CHANGE UP (ref 0.2–1)
WBC # BLD: 12.99 K/UL — HIGH (ref 3.8–10.5)
WBC # FLD AUTO: 12.99 K/UL — HIGH (ref 3.8–10.5)

## 2024-02-25 PROCEDURE — 87086 URINE CULTURE/COLONY COUNT: CPT

## 2024-02-25 PROCEDURE — 96361 HYDRATE IV INFUSION ADD-ON: CPT

## 2024-02-25 PROCEDURE — 81001 URINALYSIS AUTO W/SCOPE: CPT

## 2024-02-25 PROCEDURE — 80053 COMPREHEN METABOLIC PANEL: CPT

## 2024-02-25 PROCEDURE — G0463: CPT

## 2024-02-25 PROCEDURE — 59025 FETAL NON-STRESS TEST: CPT | Mod: 26

## 2024-02-25 PROCEDURE — 96374 THER/PROPH/DIAG INJ IV PUSH: CPT

## 2024-02-25 PROCEDURE — 99214 OFFICE O/P EST MOD 30 MIN: CPT

## 2024-02-25 PROCEDURE — 85025 COMPLETE CBC W/AUTO DIFF WBC: CPT

## 2024-02-25 PROCEDURE — 96376 TX/PRO/DX INJ SAME DRUG ADON: CPT

## 2024-02-25 RX ORDER — ONDANSETRON 8 MG/1
4 TABLET, FILM COATED ORAL ONCE
Refills: 0 | Status: COMPLETED | OUTPATIENT
Start: 2024-02-25 | End: 2024-02-25

## 2024-02-25 RX ORDER — SODIUM CHLORIDE 9 MG/ML
1000 INJECTION, SOLUTION INTRAVENOUS
Refills: 0 | Status: DISCONTINUED | OUTPATIENT
Start: 2024-02-25 | End: 2024-03-11

## 2024-02-25 RX ORDER — SODIUM CHLORIDE 9 MG/ML
1000 INJECTION, SOLUTION INTRAVENOUS ONCE
Refills: 0 | Status: DISCONTINUED | OUTPATIENT
Start: 2024-02-25 | End: 2024-03-11

## 2024-02-25 RX ADMIN — ONDANSETRON 4 MILLIGRAM(S): 8 TABLET, FILM COATED ORAL at 18:20

## 2024-02-25 RX ADMIN — ONDANSETRON 4 MILLIGRAM(S): 8 TABLET, FILM COATED ORAL at 22:10

## 2024-02-25 NOTE — OB PROVIDER TRIAGE NOTE - ADDITIONAL INSTRUCTIONS
Return for: contractions/abdominal pain, leaking of fluid, vaginal bleeding, decreased fetal movement

## 2024-02-25 NOTE — OB PROVIDER TRIAGE NOTE - HISTORY OF PRESENT ILLNESS
29yo F  at 36.2 weeks presents with nausea all day and 3 episodes of vomiting; unable to keep food down; no sick contacts; no abnormal or new food. Denies headache, visual changes, SOB, chest pain, fevers, chills, diarrhea, constipation, abdominal pain. -ctxs, -LOF, -VB, +FM. Pregnancy complicated by E Coli Uti s/p Keflex x5 days in first trimester, repeat UCx was negative    ObHx: primgravida  GynHx: Denies hx of fibroids, ovarian cysts, abnml PAP smears, STIs  MedHx: asthma (pulm = Dr Gutierrez), recurrent UTIs s/p R UPJ reflux surgery (), ?pulm HTN s/p normal TTE 2023  Meds: PNV, Spiriva, Symbicort BID  All: NKDA  PSHx:  UPJ reflux surgery ()  Social: Denies alcohol/tobacco/drug use in pregnancy  Psych: Denies hx of anxiety/depression     Vital Signs Last 24 Hrs  T(C): 36.7 (2024 17:40), Max: 36.7 (2024 17:35)  T(F): 98.06 (2024 17:40), Max: 98.1 (2024 17:35)  HR: 105 (2024 18:00) (100 - 117)  BP: 125/70 (2024 17:40) (125/70 - 125/70)  BP(mean): --  RR: 18 (2024 17:40) (18 - 18)  SpO2: 97% (2024 18:00) (92% - 97%)    PE: NAD, well appearing, abdomen soft gravid  VE: 0.0/0/-3  EFM: 150, moderate variability, +accels, -decels  [place]: q2min  EFW: 2800 grams

## 2024-02-25 NOTE — OB PROVIDER TRIAGE NOTE - NSOBPROVIDERNOTE_OBGYN_ALL_OB_FT
28 year old  at 36.4 weeks with presumed gastroenteritis    -CBC/CMP/UA  -IVF bolus then 125ml/hr  -Zofran/Tylenol PRN  -Repeat VE in 2 hours    MD Coulter aware  Mae Nova FNP-BC 28 year old  at 36.4 weeks with presumed gastroenteritis    -CBC/CMP/UA  -IVF bolus then 125ml/hr  -Zofran/Tylenol PRN  -Repeat VE in 2 hours    MD Coulter aware  Mae Nova [facility]-BC      **Update**  Patient still kelly q2min asymptomatically 4 hours later. She was re-examined and found to be 0/0/-3. Patient tolerating fluids and granola bar. She feels well. Labs wnl, UA notable for occasional bacteria with trace LE and negative nitrite. Will send of urine culture for h/o UTI in 1st trimester. Discharge home with return precautions    - Return for: contractions/abdominal pain, leaking of fluid, vaginal bleeding, decreased fetal movement  - f/u with OBGYN  - f/u urine cx    d/w Dr. Yfn Paulino, PGY3

## 2024-02-25 NOTE — OB RN TRIAGE NOTE - FALL HARM RISK - UNIVERSAL INTERVENTIONS
Bed in lowest position, wheels locked, appropriate side rails in place/Call bell, personal items and telephone in reach/Instruct patient to call for assistance before getting out of bed or chair/Non-slip footwear when patient is out of bed/[place] to call system/Physically safe environment - no spills, clutter or unnecessary equipment/Purposeful Proactive Rounding/Room/bathroom lighting operational, light cord in reach

## 2024-02-26 ENCOUNTER — APPOINTMENT (OUTPATIENT)
Dept: PULMONOLOGY | Facility: CLINIC | Age: 29
End: 2024-02-26

## 2024-02-26 LAB
CULTURE RESULTS: SIGNIFICANT CHANGE UP
SPECIMEN SOURCE: SIGNIFICANT CHANGE UP

## 2024-02-27 ENCOUNTER — APPOINTMENT (OUTPATIENT)
Dept: OBGYN | Facility: CLINIC | Age: 29
End: 2024-02-27
Payer: COMMERCIAL

## 2024-02-27 ENCOUNTER — ASOB RESULT (OUTPATIENT)
Age: 29
End: 2024-02-27

## 2024-02-27 DIAGNOSIS — J45.909 UNSPECIFIED ASTHMA, UNCOMPLICATED: ICD-10-CM

## 2024-02-27 DIAGNOSIS — Z3A.36 36 WEEKS GESTATION OF PREGNANCY: ICD-10-CM

## 2024-02-27 DIAGNOSIS — O99.513 DISEASES OF THE RESPIRATORY SYSTEM COMPLICATING PREGNANCY, THIRD TRIMESTER: ICD-10-CM

## 2024-02-27 DIAGNOSIS — B96.20 UNSPECIFIED ESCHERICHIA COLI [E. COLI] AS THE CAUSE OF DISEASES CLASSIFIED ELSEWHERE: ICD-10-CM

## 2024-02-27 DIAGNOSIS — Z36.85 ENCOUNTER FOR ANTENATAL SCREENING FOR STREPTOCOCCUS B: ICD-10-CM

## 2024-02-27 DIAGNOSIS — O21.2 LATE VOMITING OF PREGNANCY: ICD-10-CM

## 2024-02-27 DIAGNOSIS — O23.43 UNSPECIFIED INFECTION OF URINARY TRACT IN PREGNANCY, THIRD TRIMESTER: ICD-10-CM

## 2024-02-27 PROCEDURE — 76816 OB US FOLLOW-UP PER FETUS: CPT

## 2024-02-27 PROCEDURE — 76819 FETAL BIOPHYS PROFIL W/O NST: CPT | Mod: 59

## 2024-02-27 PROCEDURE — 0502F SUBSEQUENT PRENATAL CARE: CPT

## 2024-02-29 LAB
GP B STREP DNA SPEC QL NAA+PROBE: NOT DETECTED
SOURCE GBS: NORMAL

## 2024-03-04 ENCOUNTER — APPOINTMENT (OUTPATIENT)
Dept: OBGYN | Facility: CLINIC | Age: 29
End: 2024-03-04
Payer: COMMERCIAL

## 2024-03-04 ENCOUNTER — NON-APPOINTMENT (OUTPATIENT)
Age: 29
End: 2024-03-04

## 2024-03-04 PROCEDURE — 0502F SUBSEQUENT PRENATAL CARE: CPT

## 2024-03-11 ENCOUNTER — TRANSCRIPTION ENCOUNTER (OUTPATIENT)
Age: 29
End: 2024-03-11

## 2024-03-12 ENCOUNTER — APPOINTMENT (OUTPATIENT)
Dept: OBGYN | Facility: CLINIC | Age: 29
End: 2024-03-12
Payer: COMMERCIAL

## 2024-03-12 PROCEDURE — 0502F SUBSEQUENT PRENATAL CARE: CPT

## 2024-03-18 ENCOUNTER — NON-APPOINTMENT (OUTPATIENT)
Age: 29
End: 2024-03-18

## 2024-03-18 ENCOUNTER — ASOB RESULT (OUTPATIENT)
Age: 29
End: 2024-03-18

## 2024-03-18 ENCOUNTER — APPOINTMENT (OUTPATIENT)
Dept: OBGYN | Facility: CLINIC | Age: 29
End: 2024-03-18
Payer: COMMERCIAL

## 2024-03-18 PROCEDURE — 76819 FETAL BIOPHYS PROFIL W/O NST: CPT

## 2024-03-18 PROCEDURE — 0502F SUBSEQUENT PRENATAL CARE: CPT

## 2024-03-22 ENCOUNTER — APPOINTMENT (OUTPATIENT)
Dept: OBGYN | Facility: CLINIC | Age: 29
End: 2024-03-22
Payer: COMMERCIAL

## 2024-03-22 ENCOUNTER — NON-APPOINTMENT (OUTPATIENT)
Age: 29
End: 2024-03-22

## 2024-03-22 ENCOUNTER — ASOB RESULT (OUTPATIENT)
Age: 29
End: 2024-03-22

## 2024-03-22 PROCEDURE — 76818 FETAL BIOPHYS PROFILE W/NST: CPT

## 2024-03-25 ENCOUNTER — NON-APPOINTMENT (OUTPATIENT)
Age: 29
End: 2024-03-25

## 2024-03-26 ENCOUNTER — APPOINTMENT (OUTPATIENT)
Dept: OBGYN | Facility: CLINIC | Age: 29
End: 2024-03-26
Payer: COMMERCIAL

## 2024-03-26 ENCOUNTER — INPATIENT (INPATIENT)
Facility: HOSPITAL | Age: 29
LOS: 2 days | Discharge: HOME | End: 2024-03-29
Attending: OBSTETRICS & GYNECOLOGY | Admitting: OBSTETRICS & GYNECOLOGY
Payer: COMMERCIAL

## 2024-03-26 ENCOUNTER — ASOB RESULT (OUTPATIENT)
Age: 29
End: 2024-03-26

## 2024-03-26 VITALS — SYSTOLIC BLOOD PRESSURE: 117 MMHG | DIASTOLIC BLOOD PRESSURE: 73 MMHG | HEART RATE: 83 BPM

## 2024-03-26 DIAGNOSIS — O99.513 DISEASES OF THE RESPIRATORY SYSTEM COMPLICATING PREGNANCY, THIRD TRIMESTER: ICD-10-CM

## 2024-03-26 DIAGNOSIS — O41.01X2: ICD-10-CM

## 2024-03-26 DIAGNOSIS — Z34.80 ENCOUNTER FOR SUPERVISION OF OTHER NORMAL PREGNANCY, UNSPECIFIED TRIMESTER: ICD-10-CM

## 2024-03-26 DIAGNOSIS — J45.909 DISEASES OF THE RESPIRATORY SYSTEM COMPLICATING PREGNANCY, THIRD TRIMESTER: ICD-10-CM

## 2024-03-26 DIAGNOSIS — O41.00X0 OLIGOHYDRAMNIOS, UNSPECIFIED TRIMESTER, NOT APPLICABLE OR UNSPECIFIED: ICD-10-CM

## 2024-03-26 DIAGNOSIS — Z98.890 OTHER SPECIFIED POSTPROCEDURAL STATES: Chronic | ICD-10-CM

## 2024-03-26 DIAGNOSIS — O26.899 OTHER SPECIFIED PREGNANCY RELATED CONDITIONS, UNSPECIFIED TRIMESTER: ICD-10-CM

## 2024-03-26 DIAGNOSIS — O09.93 SUPERVISION OF HIGH RISK PREGNANCY, UNSPECIFIED, THIRD TRIMESTER: ICD-10-CM

## 2024-03-26 LAB
BASOPHILS # BLD AUTO: 0 K/UL — SIGNIFICANT CHANGE UP (ref 0–0.2)
BASOPHILS NFR BLD AUTO: 0 % — SIGNIFICANT CHANGE UP (ref 0–2)
EOSINOPHIL # BLD AUTO: 0.12 K/UL — SIGNIFICANT CHANGE UP (ref 0–0.5)
EOSINOPHIL NFR BLD AUTO: 0.9 % — SIGNIFICANT CHANGE UP (ref 0–6)
GIANT PLATELETS BLD QL SMEAR: PRESENT — SIGNIFICANT CHANGE UP
HCT VFR BLD CALC: 35.6 % — SIGNIFICANT CHANGE UP (ref 34.5–45)
HGB BLD-MCNC: 12.2 G/DL — SIGNIFICANT CHANGE UP (ref 11.5–15.5)
LYMPHOCYTES # BLD AUTO: 16.5 % — SIGNIFICANT CHANGE UP (ref 13–44)
LYMPHOCYTES # BLD AUTO: 2.24 K/UL — SIGNIFICANT CHANGE UP (ref 1–3.3)
MANUAL SMEAR VERIFICATION: SIGNIFICANT CHANGE UP
MCHC RBC-ENTMCNC: 30.9 PG — SIGNIFICANT CHANGE UP (ref 27–34)
MCHC RBC-ENTMCNC: 34.3 GM/DL — SIGNIFICANT CHANGE UP (ref 32–36)
MCV RBC AUTO: 90.1 FL — SIGNIFICANT CHANGE UP (ref 80–100)
MONOCYTES # BLD AUTO: 1.06 K/UL — HIGH (ref 0–0.9)
MONOCYTES NFR BLD AUTO: 7.8 % — SIGNIFICANT CHANGE UP (ref 2–14)
MYELOCYTES NFR BLD: 0.9 % — HIGH (ref 0–0)
NEUTROPHILS # BLD AUTO: 10.03 K/UL — HIGH (ref 1.8–7.4)
NEUTROPHILS NFR BLD AUTO: 73.9 % — SIGNIFICANT CHANGE UP (ref 43–77)
PLAT MORPH BLD: NORMAL — SIGNIFICANT CHANGE UP
PLATELET # BLD AUTO: 282 K/UL — SIGNIFICANT CHANGE UP (ref 150–400)
RBC # BLD: 3.95 M/UL — SIGNIFICANT CHANGE UP (ref 3.8–5.2)
RBC # FLD: 13.1 % — SIGNIFICANT CHANGE UP (ref 10.3–14.5)
RBC BLD AUTO: NORMAL — SIGNIFICANT CHANGE UP
WBC # BLD: 13.57 K/UL — HIGH (ref 3.8–10.5)
WBC # FLD AUTO: 13.57 K/UL — HIGH (ref 3.8–10.5)

## 2024-03-26 PROCEDURE — 76818 FETAL BIOPHYS PROFILE W/NST: CPT

## 2024-03-26 PROCEDURE — 59426 ANTEPARTUM CARE ONLY: CPT

## 2024-03-26 PROCEDURE — 0502F SUBSEQUENT PRENATAL CARE: CPT

## 2024-03-26 PROCEDURE — 76816 OB US FOLLOW-UP PER FETUS: CPT

## 2024-03-26 PROCEDURE — 86077 PHYS BLOOD BANK SERV XMATCH: CPT

## 2024-03-26 RX ORDER — BUDESONIDE AND FORMOTEROL FUMARATE DIHYDRATE 160; 4.5 UG/1; UG/1
2 AEROSOL RESPIRATORY (INHALATION)
Refills: 0 | Status: DISCONTINUED | OUTPATIENT
Start: 2024-03-26 | End: 2024-03-29

## 2024-03-26 RX ORDER — ACETAMINOPHEN 500 MG
975 TABLET ORAL
Refills: 0 | Status: DISCONTINUED | OUTPATIENT
Start: 2024-03-26 | End: 2024-03-26

## 2024-03-26 RX ORDER — TIOTROPIUM BROMIDE 18 UG/1
2 CAPSULE ORAL; RESPIRATORY (INHALATION) AT BEDTIME
Refills: 0 | Status: DISCONTINUED | OUTPATIENT
Start: 2024-03-26 | End: 2024-03-29

## 2024-03-26 RX ORDER — CITRIC ACID/SODIUM CITRATE 300-500 MG
15 SOLUTION, ORAL ORAL EVERY 6 HOURS
Refills: 0 | Status: DISCONTINUED | OUTPATIENT
Start: 2024-03-26 | End: 2024-03-28

## 2024-03-26 RX ORDER — OXYCODONE HYDROCHLORIDE 5 MG/1
5 TABLET ORAL ONCE
Refills: 0 | Status: DISCONTINUED | OUTPATIENT
Start: 2024-03-26 | End: 2024-03-26

## 2024-03-26 RX ORDER — AER TRAVELER 0.5 G/1
1 SOLUTION RECTAL; TOPICAL EVERY 4 HOURS
Refills: 0 | Status: DISCONTINUED | OUTPATIENT
Start: 2024-03-26 | End: 2024-03-26

## 2024-03-26 RX ORDER — DIPHENHYDRAMINE HCL 50 MG
25 CAPSULE ORAL EVERY 6 HOURS
Refills: 0 | Status: DISCONTINUED | OUTPATIENT
Start: 2024-03-26 | End: 2024-03-26

## 2024-03-26 RX ORDER — SODIUM CHLORIDE 9 MG/ML
3 INJECTION INTRAMUSCULAR; INTRAVENOUS; SUBCUTANEOUS EVERY 8 HOURS
Refills: 0 | Status: DISCONTINUED | OUTPATIENT
Start: 2024-03-26 | End: 2024-03-26

## 2024-03-26 RX ORDER — ALBUTEROL 90 UG/1
2 AEROSOL, METERED ORAL EVERY 6 HOURS
Refills: 0 | Status: DISCONTINUED | OUTPATIENT
Start: 2024-03-26 | End: 2024-03-29

## 2024-03-26 RX ORDER — TETANUS TOXOID, REDUCED DIPHTHERIA TOXOID AND ACELLULAR PERTUSSIS VACCINE, ADSORBED 5; 2.5; 8; 8; 2.5 [IU]/.5ML; [IU]/.5ML; UG/.5ML; UG/.5ML; UG/.5ML
0.5 SUSPENSION INTRAMUSCULAR ONCE
Refills: 0 | Status: DISCONTINUED | OUTPATIENT
Start: 2024-03-26 | End: 2024-03-26

## 2024-03-26 RX ORDER — DIBUCAINE 1 %
1 OINTMENT (GRAM) RECTAL EVERY 6 HOURS
Refills: 0 | Status: DISCONTINUED | OUTPATIENT
Start: 2024-03-26 | End: 2024-03-26

## 2024-03-26 RX ORDER — SIMETHICONE 80 MG/1
80 TABLET, CHEWABLE ORAL EVERY 4 HOURS
Refills: 0 | Status: DISCONTINUED | OUTPATIENT
Start: 2024-03-26 | End: 2024-03-26

## 2024-03-26 RX ORDER — OXYTOCIN 10 UNIT/ML
333.33 VIAL (ML) INJECTION
Qty: 20 | Refills: 0 | Status: COMPLETED | OUTPATIENT
Start: 2024-03-26 | End: 2024-03-26

## 2024-03-26 RX ORDER — LANOLIN
1 OINTMENT (GRAM) TOPICAL EVERY 6 HOURS
Refills: 0 | Status: DISCONTINUED | OUTPATIENT
Start: 2024-03-26 | End: 2024-03-26

## 2024-03-26 RX ORDER — KETOROLAC TROMETHAMINE 30 MG/ML
30 SYRINGE (ML) INJECTION ONCE
Refills: 0 | Status: DISCONTINUED | OUTPATIENT
Start: 2024-03-26 | End: 2024-03-26

## 2024-03-26 RX ORDER — ONDANSETRON 8 MG/1
4 TABLET, FILM COATED ORAL ONCE
Refills: 0 | Status: COMPLETED | OUTPATIENT
Start: 2024-03-26 | End: 2024-03-26

## 2024-03-26 RX ORDER — PRAMOXINE HYDROCHLORIDE 150 MG/15G
1 AEROSOL, FOAM RECTAL EVERY 4 HOURS
Refills: 0 | Status: DISCONTINUED | OUTPATIENT
Start: 2024-03-26 | End: 2024-03-26

## 2024-03-26 RX ORDER — SODIUM CHLORIDE 9 MG/ML
1000 INJECTION, SOLUTION INTRAVENOUS
Refills: 0 | Status: DISCONTINUED | OUTPATIENT
Start: 2024-03-26 | End: 2024-03-28

## 2024-03-26 RX ORDER — IBUPROFEN 200 MG
600 TABLET ORAL EVERY 6 HOURS
Refills: 0 | Status: DISCONTINUED | OUTPATIENT
Start: 2024-03-26 | End: 2024-03-26

## 2024-03-26 RX ORDER — CHLORHEXIDINE GLUCONATE 213 G/1000ML
1 SOLUTION TOPICAL DAILY
Refills: 0 | Status: DISCONTINUED | OUTPATIENT
Start: 2024-03-26 | End: 2024-03-28

## 2024-03-26 RX ORDER — OXYTOCIN 10 UNIT/ML
41.67 VIAL (ML) INJECTION
Qty: 20 | Refills: 0 | Status: DISCONTINUED | OUTPATIENT
Start: 2024-03-26 | End: 2024-03-26

## 2024-03-26 RX ORDER — MAGNESIUM HYDROXIDE 400 MG/1
30 TABLET, CHEWABLE ORAL
Refills: 0 | Status: DISCONTINUED | OUTPATIENT
Start: 2024-03-26 | End: 2024-03-26

## 2024-03-26 RX ORDER — BENZOCAINE 10 %
1 GEL (GRAM) MUCOUS MEMBRANE EVERY 6 HOURS
Refills: 0 | Status: DISCONTINUED | OUTPATIENT
Start: 2024-03-26 | End: 2024-03-26

## 2024-03-26 RX ORDER — OXYCODONE HYDROCHLORIDE 5 MG/1
5 TABLET ORAL
Refills: 0 | Status: DISCONTINUED | OUTPATIENT
Start: 2024-03-26 | End: 2024-03-26

## 2024-03-26 RX ORDER — HYDROCORTISONE 1 %
1 OINTMENT (GRAM) TOPICAL EVERY 6 HOURS
Refills: 0 | Status: DISCONTINUED | OUTPATIENT
Start: 2024-03-26 | End: 2024-03-26

## 2024-03-26 RX ADMIN — BUDESONIDE AND FORMOTEROL FUMARATE DIHYDRATE 2 PUFF(S): 160; 4.5 AEROSOL RESPIRATORY (INHALATION) at 22:35

## 2024-03-26 RX ADMIN — TIOTROPIUM BROMIDE 2 PUFF(S): 18 CAPSULE ORAL; RESPIRATORY (INHALATION) at 22:35

## 2024-03-26 RX ADMIN — ONDANSETRON 4 MILLIGRAM(S): 8 TABLET, FILM COATED ORAL at 23:17

## 2024-03-26 RX ADMIN — SODIUM CHLORIDE 125 MILLILITER(S): 9 INJECTION, SOLUTION INTRAVENOUS at 21:52

## 2024-03-26 NOTE — OB RN PATIENT PROFILE - FUNCTIONAL ASSESSMENT - DAILY ACTIVITY SCORE HIDDEN
Patient Seen in: [facility]    History   Patient presents with:  Chest Pain Angina (cardiovascular)    Stated Complaint: COUGH CHEST PAIN/DISCOMFORT     HPI  18-year-old male, with no history of hypertension, no diabetes, no hyp and negative except as noted above. PSFH elements reviewed from today and agreed except as otherwise stated in HPI.     Physical Exam       ED Triage Vitals   BP 03/21/17 1758 163/96 mmHg   Pulse 03/21/17 1758 71   Resp 03/21/17 1758 20   Temp 03/21/17 1 PA and lateral chest radiographs were obtained. PATIENT STATED HISTORY: (As transcribed by Technologist)  Patient states that he had upper left sided chest that was accompanied by nausea and dizziness three hours ago.  Patient states that the symptoms hav 24

## 2024-03-26 NOTE — OB PROVIDER H&P - ASSESSMENT
Assessment   29yoF  at 40w4d gestational age presents for IOL for oligohydramnios diagnosed earlier today in office, EDMAR of 3. Reports no other issues during pregnancy. GBS neg. Patient has a history of recurrent UTIs and history of R UPJ surgery, had 1 episode of UTI early in pregnancy but no issues since. Of note, patient also has history of suspected pulmonary hypertension in , further workup within normal limits and no symptoms during pregnancy.     Plan  1. Admit to LND. Routine Labs. IVF.  2. IOL w/ buccal cytotec  3. Fetus: cat 1 tracing. VTX. EFW 3300g. Continuous EFM. Sono. No concerns.  4. Prenatal issues: oligo  5. GBS neg  6. Pain: IV pain meds/epidural PRN    Patient discussed with attending physician, Dr. Alysa Mratinez MD PGY1

## 2024-03-26 NOTE — OB PROVIDER H&P - NSHPPHYSICALEXAM_GEN_ALL_CORE
Objective  – Vital Signs  BP:   HR:   Temp:   – PE:   CV: RRR  Pulm: breathing comfortably on RA  Abd: gravid, nontender  Extr: moving all extremities with ease    – VE: 0/0/-3  – FHT: baseline 130, mod variability, +accels, -decels  – [place]: not kelly  – EFW: 3300  – Sono: vertex

## 2024-03-26 NOTE — OB PROVIDER H&P - HISTORY OF PRESENT ILLNESS
Admission H&P    Subjective  HPI: 29yoF  at 40w4d gestational age presents to triage for low EDMAR in clinic. Pt was diagnosed with oligohydramnios earlier today with EDMAR of 3. Reports no other issues during pregnancy. No hypertensive or diabetic disorders. +FM. -LOF. -CTXs. -VB. Pt denies any other concerns.    – PNC: Had UTI very early in pregnancy, treated and no issues since. GBS neg.  EFW 3300g  – OBHx: G1  – GynHx: denies fibroids/cysts/STIs  – PMH: Celiac disease, recurrent UTIs s/p R UPJ surgery in , history of suspected pulmonary hypertension in  diagnosed on TTE, further workup and repeat TTE normal and patient not symptomatic during this pregnancy. History of asthma with hospitalization in , no intubation. Follows with Dr. Gutierrez (pulm), well controlled on Albuterol BID, Symbicort 160-4.5 BID, Spiriva qHS.  – PSH: R UPJ surgery in   – Psych: denies anx/dep  – Social: denies t/e/d use   – Meds: PNV, Albuterol BID, Symbicort 160-4.5 BID, Spiriva qHS  – Allergies: gluten  – Will accept blood transfusions? Yes

## 2024-03-26 NOTE — OB RN PATIENT PROFILE - NS_OBGYNHISTORY_OBGYN_ALL_OB_FT
g1 po  oligohydramnios; h/o possible pulomonary HTN which was ruled out   pt followed by Dr Gutierrez

## 2024-03-26 NOTE — OB PROVIDER H&P - NSLOWPPHRISK_OBGYN_A_OB
No previous uterine incision/[place] Pregnancy/Less than or equal to 4 previous vaginal births/No known bleeding disorder/No history of postpartum hemorrhage/No other PPH risks indicated

## 2024-03-27 ENCOUNTER — NON-APPOINTMENT (OUTPATIENT)
Age: 29
End: 2024-03-27

## 2024-03-27 LAB — T PALLIDUM AB TITR SER: NEGATIVE — SIGNIFICANT CHANGE UP

## 2024-03-27 PROCEDURE — 59410 OBSTETRICAL CARE: CPT

## 2024-03-27 RX ORDER — OXYTOCIN 10 UNIT/ML
4 VIAL (ML) INJECTION
Qty: 30 | Refills: 0 | Status: DISCONTINUED | OUTPATIENT
Start: 2024-03-27 | End: 2024-03-29

## 2024-03-27 RX ORDER — MAGNESIUM HYDROXIDE 400 MG/1
30 TABLET, CHEWABLE ORAL
Refills: 0 | Status: DISCONTINUED | OUTPATIENT
Start: 2024-03-27 | End: 2024-03-29

## 2024-03-27 RX ORDER — SODIUM CHLORIDE 9 MG/ML
3 INJECTION INTRAMUSCULAR; INTRAVENOUS; SUBCUTANEOUS EVERY 8 HOURS
Refills: 0 | Status: DISCONTINUED | OUTPATIENT
Start: 2024-03-27 | End: 2024-03-29

## 2024-03-27 RX ORDER — ONDANSETRON 8 MG/1
4 TABLET, FILM COATED ORAL ONCE
Refills: 0 | Status: COMPLETED | OUTPATIENT
Start: 2024-03-27 | End: 2024-03-27

## 2024-03-27 RX ORDER — OXYCODONE HYDROCHLORIDE 5 MG/1
5 TABLET ORAL
Refills: 0 | Status: DISCONTINUED | OUTPATIENT
Start: 2024-03-27 | End: 2024-03-29

## 2024-03-27 RX ORDER — OXYCODONE HYDROCHLORIDE 5 MG/1
5 TABLET ORAL ONCE
Refills: 0 | Status: DISCONTINUED | OUTPATIENT
Start: 2024-03-27 | End: 2024-03-29

## 2024-03-27 RX ORDER — ERTAPENEM SODIUM 1 G/1
1000 INJECTION, POWDER, LYOPHILIZED, FOR SOLUTION INTRAMUSCULAR; INTRAVENOUS ONCE
Refills: 0 | Status: DISCONTINUED | OUTPATIENT
Start: 2024-03-27 | End: 2024-03-28

## 2024-03-27 RX ORDER — IBUPROFEN 200 MG
600 TABLET ORAL EVERY 6 HOURS
Refills: 0 | Status: COMPLETED | OUTPATIENT
Start: 2024-03-27 | End: 2025-02-23

## 2024-03-27 RX ORDER — BENZOCAINE 10 %
1 GEL (GRAM) MUCOUS MEMBRANE EVERY 6 HOURS
Refills: 0 | Status: DISCONTINUED | OUTPATIENT
Start: 2024-03-27 | End: 2024-03-29

## 2024-03-27 RX ORDER — ONDANSETRON 8 MG/1
4 TABLET, FILM COATED ORAL ONCE
Refills: 0 | Status: COMPLETED | OUTPATIENT
Start: 2024-03-27 | End: 2024-03-28

## 2024-03-27 RX ORDER — KETOROLAC TROMETHAMINE 30 MG/ML
30 SYRINGE (ML) INJECTION ONCE
Refills: 0 | Status: DISCONTINUED | OUTPATIENT
Start: 2024-03-27 | End: 2024-03-29

## 2024-03-27 RX ORDER — TRANEXAMIC ACID 100 MG/ML
1000 INJECTION, SOLUTION INTRAVENOUS ONCE
Refills: 0 | Status: COMPLETED | OUTPATIENT
Start: 2024-03-27 | End: 2024-03-27

## 2024-03-27 RX ORDER — TETANUS TOXOID, REDUCED DIPHTHERIA TOXOID AND ACELLULAR PERTUSSIS VACCINE, ADSORBED 5; 2.5; 8; 8; 2.5 [IU]/.5ML; [IU]/.5ML; UG/.5ML; UG/.5ML; UG/.5ML
0.5 SUSPENSION INTRAMUSCULAR ONCE
Refills: 0 | Status: DISCONTINUED | OUTPATIENT
Start: 2024-03-27 | End: 2024-03-29

## 2024-03-27 RX ORDER — OXYTOCIN 10 UNIT/ML
10 VIAL (ML) INJECTION ONCE
Refills: 0 | Status: COMPLETED | OUTPATIENT
Start: 2024-03-27 | End: 2024-03-27

## 2024-03-27 RX ORDER — ACETAMINOPHEN 500 MG
975 TABLET ORAL
Refills: 0 | Status: DISCONTINUED | OUTPATIENT
Start: 2024-03-27 | End: 2024-03-29

## 2024-03-27 RX ORDER — DIPHENHYDRAMINE HCL 50 MG
25 CAPSULE ORAL EVERY 6 HOURS
Refills: 0 | Status: DISCONTINUED | OUTPATIENT
Start: 2024-03-27 | End: 2024-03-29

## 2024-03-27 RX ORDER — LANOLIN
1 OINTMENT (GRAM) TOPICAL EVERY 6 HOURS
Refills: 0 | Status: DISCONTINUED | OUTPATIENT
Start: 2024-03-27 | End: 2024-03-29

## 2024-03-27 RX ORDER — DIBUCAINE 1 %
1 OINTMENT (GRAM) RECTAL EVERY 6 HOURS
Refills: 0 | Status: DISCONTINUED | OUTPATIENT
Start: 2024-03-27 | End: 2024-03-29

## 2024-03-27 RX ORDER — SIMETHICONE 80 MG/1
80 TABLET, CHEWABLE ORAL EVERY 4 HOURS
Refills: 0 | Status: DISCONTINUED | OUTPATIENT
Start: 2024-03-27 | End: 2024-03-29

## 2024-03-27 RX ORDER — METOCLOPRAMIDE HCL 10 MG
10 TABLET ORAL ONCE
Refills: 0 | Status: COMPLETED | OUTPATIENT
Start: 2024-03-27 | End: 2024-03-27

## 2024-03-27 RX ORDER — AER TRAVELER 0.5 G/1
1 SOLUTION RECTAL; TOPICAL EVERY 4 HOURS
Refills: 0 | Status: DISCONTINUED | OUTPATIENT
Start: 2024-03-27 | End: 2024-03-29

## 2024-03-27 RX ORDER — PRAMOXINE HYDROCHLORIDE 150 MG/15G
1 AEROSOL, FOAM RECTAL EVERY 4 HOURS
Refills: 0 | Status: DISCONTINUED | OUTPATIENT
Start: 2024-03-27 | End: 2024-03-29

## 2024-03-27 RX ORDER — HYDROCORTISONE 1 %
1 OINTMENT (GRAM) TOPICAL EVERY 6 HOURS
Refills: 0 | Status: DISCONTINUED | OUTPATIENT
Start: 2024-03-27 | End: 2024-03-29

## 2024-03-27 RX ORDER — OXYTOCIN 10 UNIT/ML
41.67 VIAL (ML) INJECTION
Qty: 20 | Refills: 0 | Status: DISCONTINUED | OUTPATIENT
Start: 2024-03-27 | End: 2024-03-29

## 2024-03-27 RX ADMIN — SODIUM CHLORIDE 125 MILLILITER(S): 9 INJECTION, SOLUTION INTRAVENOUS at 21:39

## 2024-03-27 RX ADMIN — SODIUM CHLORIDE 125 MILLILITER(S): 9 INJECTION, SOLUTION INTRAVENOUS at 00:25

## 2024-03-27 RX ADMIN — Medication 10 MILLIGRAM(S): at 21:49

## 2024-03-27 RX ADMIN — Medication 1000 MILLIUNIT(S)/MIN: at 23:14

## 2024-03-27 RX ADMIN — Medication 0.2 MILLIGRAM(S): at 23:19

## 2024-03-27 RX ADMIN — ONDANSETRON 4 MILLIGRAM(S): 8 TABLET, FILM COATED ORAL at 17:28

## 2024-03-27 RX ADMIN — TIOTROPIUM BROMIDE 2 PUFF(S): 18 CAPSULE ORAL; RESPIRATORY (INHALATION) at 21:51

## 2024-03-27 RX ADMIN — Medication 4 MILLIUNIT(S)/MIN: at 09:06

## 2024-03-27 RX ADMIN — Medication 10 UNIT(S): at 23:17

## 2024-03-27 RX ADMIN — BUDESONIDE AND FORMOTEROL FUMARATE DIHYDRATE 2 PUFF(S): 160; 4.5 AEROSOL RESPIRATORY (INHALATION) at 10:09

## 2024-03-27 RX ADMIN — BUDESONIDE AND FORMOTEROL FUMARATE DIHYDRATE 2 PUFF(S): 160; 4.5 AEROSOL RESPIRATORY (INHALATION) at 21:50

## 2024-03-27 RX ADMIN — TRANEXAMIC ACID 220 MILLIGRAM(S): 100 INJECTION, SOLUTION INTRAVENOUS at 23:24

## 2024-03-27 RX ADMIN — SODIUM CHLORIDE 125 MILLILITER(S): 9 INJECTION, SOLUTION INTRAVENOUS at 13:27

## 2024-03-27 NOTE — OB PROVIDER LABOR PROGRESS NOTE - NS_SUBJECTIVE/OBJECTIVE_OBGYN_ALL_OB_FT
pt c/o nausea
Pt evaluated at bedside for a cervical balloon placement. Pt states experiencing occasional cramping but does not desire an epidural at this time.     ICU Vital Signs Last 24 Hrs  T(C): 36.6 (26 Mar 2024 20:42), Max: 37.2 (26 Mar 2024 18:50)  T(F): 97.88 (26 Mar 2024 20:42), Max: 98.96 (26 Mar 2024 18:50)  HR: 69 (26 Mar 2024 22:25) (65 - 106)  BP: 128/84 (26 Mar 2024 20:42) (117/73 - 131/85)  RR: 18 (26 Mar 2024 20:42) (18 - 20)  SpO2: 99% (26 Mar 2024 22:25) (91% - 100%)    O2 Parameters below as of 26 Mar 2024 13:29  Patient On (Oxygen Delivery Method): room air

## 2024-03-27 NOTE — OB PROVIDER LABOR PROGRESS NOTE - ASSESSMENT
30yo P0, IOL for oligohydramnios  no cervical change over past 2 hours despite adequate contraction pattern  placed IUPC  continue suzette Winkler MD
29F  40w4d gestational age admitted for an IOL for newly diagnosed oligohydramnios (EDMAR 3).    -Cervical balloon placement discussed with pt, pt verbalized understanding and agreement of placement, all questions answered  -Cook cervical balloon placed with 60cc NS in each intrauterine and vaginal balloon without complications, pt tolerated procedure well  -Continue PO cytotec  -Continue to monitor EFM/toco  -GBS negative    Discussed with Dr. Alysa Brewer PA-C

## 2024-03-28 LAB
BASOPHILS # BLD AUTO: 0 K/UL — SIGNIFICANT CHANGE UP (ref 0–0.2)
BASOPHILS # BLD AUTO: 0.19 K/UL — SIGNIFICANT CHANGE UP (ref 0–0.2)
BASOPHILS NFR BLD AUTO: 0 % — SIGNIFICANT CHANGE UP (ref 0–2)
BASOPHILS NFR BLD AUTO: 0.5 % — SIGNIFICANT CHANGE UP (ref 0–2)
EOSINOPHIL # BLD AUTO: 0 K/UL — SIGNIFICANT CHANGE UP (ref 0–0.5)
EOSINOPHIL # BLD AUTO: 0.03 K/UL — SIGNIFICANT CHANGE UP (ref 0–0.5)
EOSINOPHIL NFR BLD AUTO: 0 % — SIGNIFICANT CHANGE UP (ref 0–6)
EOSINOPHIL NFR BLD AUTO: 0.1 % — SIGNIFICANT CHANGE UP (ref 0–6)
GIANT PLATELETS BLD QL SMEAR: PRESENT — SIGNIFICANT CHANGE UP
HCT VFR BLD CALC: 26.4 % — LOW (ref 34.5–45)
HCT VFR BLD CALC: 33.8 % — LOW (ref 34.5–45)
HGB BLD-MCNC: 11.3 G/DL — LOW (ref 11.5–15.5)
HGB BLD-MCNC: 9.1 G/DL — LOW (ref 11.5–15.5)
IMM GRANULOCYTES NFR BLD AUTO: 2.3 % — HIGH (ref 0–0.9)
KLEIHAUER-BETKE CALCULATION: 0.2 % — SIGNIFICANT CHANGE UP (ref 0–0.2)
LYMPHOCYTES # BLD AUTO: 1.03 K/UL — SIGNIFICANT CHANGE UP (ref 1–3.3)
LYMPHOCYTES # BLD AUTO: 1.15 K/UL — SIGNIFICANT CHANGE UP (ref 1–3.3)
LYMPHOCYTES # BLD AUTO: 2.8 % — LOW (ref 13–44)
LYMPHOCYTES # BLD AUTO: 4.3 % — LOW (ref 13–44)
MANUAL SMEAR VERIFICATION: SIGNIFICANT CHANGE UP
MCHC RBC-ENTMCNC: 30.5 PG — SIGNIFICANT CHANGE UP (ref 27–34)
MCHC RBC-ENTMCNC: 31.2 PG — SIGNIFICANT CHANGE UP (ref 27–34)
MCHC RBC-ENTMCNC: 33.4 GM/DL — SIGNIFICANT CHANGE UP (ref 32–36)
MCHC RBC-ENTMCNC: 34.5 GM/DL — SIGNIFICANT CHANGE UP (ref 32–36)
MCV RBC AUTO: 90.4 FL — SIGNIFICANT CHANGE UP (ref 80–100)
MCV RBC AUTO: 91.1 FL — SIGNIFICANT CHANGE UP (ref 80–100)
METAMYELOCYTES # FLD: 0.9 % — HIGH (ref 0–0)
MONOCYTES # BLD AUTO: 2.32 K/UL — HIGH (ref 0–0.9)
MONOCYTES # BLD AUTO: 2.38 K/UL — HIGH (ref 0–0.9)
MONOCYTES NFR BLD AUTO: 6.5 % — SIGNIFICANT CHANGE UP (ref 2–14)
MONOCYTES NFR BLD AUTO: 8.7 % — SIGNIFICANT CHANGE UP (ref 2–14)
NEUTROPHILS # BLD AUTO: 23 K/UL — HIGH (ref 1.8–7.4)
NEUTROPHILS # BLD AUTO: 32.37 K/UL — HIGH (ref 1.8–7.4)
NEUTROPHILS NFR BLD AUTO: 86.1 % — HIGH (ref 43–77)
NEUTROPHILS NFR BLD AUTO: 87.8 % — HIGH (ref 43–77)
NRBC # BLD: 0 /100 WBCS — SIGNIFICANT CHANGE UP (ref 0–0)
PLAT MORPH BLD: NORMAL — SIGNIFICANT CHANGE UP
PLATELET # BLD AUTO: 127 K/UL — LOW (ref 150–400)
PLATELET # BLD AUTO: 230 K/UL — SIGNIFICANT CHANGE UP (ref 150–400)
RBC # BLD: 2.92 M/UL — LOW (ref 3.8–5.2)
RBC # BLD: 3.71 M/UL — LOW (ref 3.8–5.2)
RBC # FLD: 13.2 % — SIGNIFICANT CHANGE UP (ref 10.3–14.5)
RBC # FLD: 13.3 % — SIGNIFICANT CHANGE UP (ref 10.3–14.5)
RBC BLD AUTO: NORMAL — SIGNIFICANT CHANGE UP
WBC # BLD: 26.71 K/UL — HIGH (ref 3.8–10.5)
WBC # BLD: 36.86 K/UL — HIGH (ref 3.8–10.5)
WBC # FLD AUTO: 26.71 K/UL — HIGH (ref 3.8–10.5)
WBC # FLD AUTO: 36.86 K/UL — HIGH (ref 3.8–10.5)

## 2024-03-28 RX ORDER — FERROUS SULFATE 325(65) MG
325 TABLET ORAL DAILY
Refills: 0 | Status: DISCONTINUED | OUTPATIENT
Start: 2024-03-28 | End: 2024-03-29

## 2024-03-28 RX ORDER — IBUPROFEN 200 MG
600 TABLET ORAL EVERY 6 HOURS
Refills: 0 | Status: DISCONTINUED | OUTPATIENT
Start: 2024-03-28 | End: 2024-03-28

## 2024-03-28 RX ORDER — IBUPROFEN 200 MG
600 TABLET ORAL EVERY 6 HOURS
Refills: 0 | Status: DISCONTINUED | OUTPATIENT
Start: 2024-03-28 | End: 2024-03-29

## 2024-03-28 RX ORDER — CEFAZOLIN SODIUM 1 G
2000 VIAL (EA) INJECTION ONCE
Refills: 0 | Status: COMPLETED | OUTPATIENT
Start: 2024-03-28 | End: 2024-03-28

## 2024-03-28 RX ORDER — SODIUM CHLORIDE 9 MG/ML
1000 INJECTION, SOLUTION INTRAVENOUS ONCE
Refills: 0 | Status: COMPLETED | OUTPATIENT
Start: 2024-03-28 | End: 2024-03-28

## 2024-03-28 RX ORDER — ACETAMINOPHEN 500 MG
1000 TABLET ORAL ONCE
Refills: 0 | Status: COMPLETED | OUTPATIENT
Start: 2024-03-28 | End: 2024-03-28

## 2024-03-28 RX ORDER — ASCORBIC ACID 60 MG
500 TABLET,CHEWABLE ORAL DAILY
Refills: 0 | Status: DISCONTINUED | OUTPATIENT
Start: 2024-03-28 | End: 2024-03-29

## 2024-03-28 RX ADMIN — Medication 975 MILLIGRAM(S): at 21:00

## 2024-03-28 RX ADMIN — Medication 0.2 MILLIGRAM(S): at 13:18

## 2024-03-28 RX ADMIN — Medication 600 MILLIGRAM(S): at 18:49

## 2024-03-28 RX ADMIN — Medication 0.2 MILLIGRAM(S): at 20:29

## 2024-03-28 RX ADMIN — Medication 600 MILLIGRAM(S): at 18:08

## 2024-03-28 RX ADMIN — BUDESONIDE AND FORMOTEROL FUMARATE DIHYDRATE 2 PUFF(S): 160; 4.5 AEROSOL RESPIRATORY (INHALATION) at 22:00

## 2024-03-28 RX ADMIN — Medication 600 MILLIGRAM(S): at 14:00

## 2024-03-28 RX ADMIN — ONDANSETRON 4 MILLIGRAM(S): 8 TABLET, FILM COATED ORAL at 00:01

## 2024-03-28 RX ADMIN — SODIUM CHLORIDE 2000 MILLILITER(S): 9 INJECTION, SOLUTION INTRAVENOUS at 00:10

## 2024-03-28 RX ADMIN — Medication 600 MILLIGRAM(S): at 23:03

## 2024-03-28 RX ADMIN — Medication 975 MILLIGRAM(S): at 16:30

## 2024-03-28 RX ADMIN — Medication 600 MILLIGRAM(S): at 23:56

## 2024-03-28 RX ADMIN — Medication 1 TABLET(S): at 13:17

## 2024-03-28 RX ADMIN — Medication 0.2 MILLIGRAM(S): at 09:19

## 2024-03-28 RX ADMIN — Medication 975 MILLIGRAM(S): at 16:09

## 2024-03-28 RX ADMIN — Medication 100 MILLIGRAM(S): at 00:06

## 2024-03-28 RX ADMIN — Medication 975 MILLIGRAM(S): at 20:28

## 2024-03-28 RX ADMIN — Medication 500 MILLIGRAM(S): at 16:10

## 2024-03-28 RX ADMIN — SODIUM CHLORIDE 3 MILLILITER(S): 9 INJECTION INTRAMUSCULAR; INTRAVENOUS; SUBCUTANEOUS at 16:08

## 2024-03-28 RX ADMIN — Medication 975 MILLIGRAM(S): at 09:05

## 2024-03-28 RX ADMIN — Medication 325 MILLIGRAM(S): at 13:18

## 2024-03-28 RX ADMIN — TIOTROPIUM BROMIDE 2 PUFF(S): 18 CAPSULE ORAL; RESPIRATORY (INHALATION) at 22:00

## 2024-03-28 RX ADMIN — Medication 0.2 MILLIGRAM(S): at 16:40

## 2024-03-28 RX ADMIN — SODIUM CHLORIDE 3 MILLILITER(S): 9 INJECTION INTRAMUSCULAR; INTRAVENOUS; SUBCUTANEOUS at 05:46

## 2024-03-28 RX ADMIN — Medication 975 MILLIGRAM(S): at 09:30

## 2024-03-28 RX ADMIN — Medication 600 MILLIGRAM(S): at 13:18

## 2024-03-28 RX ADMIN — Medication 400 MILLIGRAM(S): at 02:04

## 2024-03-28 RX ADMIN — Medication 0.2 MILLIGRAM(S): at 04:00

## 2024-03-28 NOTE — CHART NOTE - NSCHARTNOTEFT_GEN_A_CORE
Called to room s/p  c/b manual removal of placenta.     Pt was noted to have active vaginal bleeding. BSUS demonstrated a thin endometrial lining without concern for retained placental tissue.   Vaginal /cervix was inspected. No concern for laceration.   Bimanual exam demonstrated intermittent uterine atony   Pt received an additional 10 IM of pitocin, methergine 0.2mg x1, cytotec 1000mcg [place], and TXA 1g   Anesthesia to beside to place 2nd IV. STAT CBC was drawn   EBL approximated to be 1L at this time. Vital signs stable. HR: 110's, /80s     Persistent uterine atony noted and DIPAK was placed at 11:45  Placement was confirmed on BSUS. Balloon was instilled and DIPAK was hooked to suction with an immediate output of ~25cc (1/4 of the tubing)      Pt was then reassessed 1 hour later.  Pt feels well, not in pain. Denies lightheaded/ dizzy sensations at rest.   No bleeding noted on pad. No active bleeding around the balloon.   Output totals 60cc in the suction tubing. STAT CBC demonstrates a Hct drop from 35.6->33.8     Will reassess for DIPAK removal in 30 min   Plan for AM CBC     DW: Dr. Peterson Guerrero, PGY 3 Called to room s/p  c/b manual removal of placenta.     Pt was noted to have active vaginal bleeding. BSUS demonstrated a thin endometrial lining without concern for retained placental tissue.   Vaginal /cervix was inspected. No concern for laceration.   Bimanual exam demonstrated intermittent uterine atony   Pt received an additional 10 IM of pitocin, methergine 0.2mg x1, cytotec 1000mcg [place], and TXA 1g   Anesthesia to beside to place 2nd IV. STAT CBC was drawn   EBL approximated to be 1L at this time. Vital signs stable. HR: 110's, /80s     Persistent uterine atony noted and DIPAK was placed at 11:45  Placement was confirmed on BSUS. Balloon was instilled and DIPAK was hooked to suction with an immediate output of ~25cc (1/4 of the tubing)      Pt was then reassessed 1 hour later.  Pt feels well, not in pain. Denies lightheaded/ dizzy sensations at rest.   No bleeding noted on pad. No active bleeding around the balloon.   Output totals 60cc in the suction tubing. STAT CBC demonstrates a Hct drop from 35.6->33.8     DIPAK reassess 30 min after removal from suction.   No additional vaginal bleeding noted at this time on the pad or in the tubing.   Fudus is firm, midline, and below the umbilicus.     Plan for AM CBC     DW: Dr. Peterson Guerrero, PGY 3

## 2024-03-28 NOTE — OB PROVIDER DELIVERY SUMMARY - NSSELHIDDEN_OBGYN_ALL_OB_FT
[NS_DeliveryAttending1_OBGYN_ALL_OB_FT:[id number]],[NS_DeliveryAssist1_OBGYN_ALL_OB_FT:[id number]],[NS_DeliveryRN_OBGYN_ALL_OB_FT:[id number]]

## 2024-03-28 NOTE — OB PROVIDER DELIVERY SUMMARY - NS_ROMTYPE_OBGYN_ALL_OB
----- Message from Interface, User sent at 10/20/2016 10:15 PM CDT -----  Regarding: Cancellation of Order # [id number]  Order number [id number] for the procedure US LARGE VESSELS DUPLEX   COMPLETE NON EXTREMITY [[id number]] has been canceled by User   Interface [[id number]]. This procedure was ordered by Parmjit Staples DO [[id number]] on May 23, 2016 for the patient Arabella Garcia [[id number]]. The reason for cancellation was \"Order \".    This was a future order.   AROM

## 2024-03-28 NOTE — OB RN DELIVERY SUMMARY - NSSELHIDDEN_OBGYN_ALL_OB_FT
[NS_DeliveryAttending1_OBGYN_ALL_OB_FT:[id number]==],[NS_DeliveryAssist1_OBGYN_ALL_OB_FT:[id number]],[NS_DeliveryRN_OBGYN_ALL_OB_FT:[id number]]

## 2024-03-28 NOTE — OB PROVIDER DELIVERY SUMMARY - NSPROVIDERDELIVERYNOTE_OBGYN_ALL_OB_FT
Spontaneous vaginal delivery of liveborn infant from OA position. Head, shoulders, and body delivered easily. Infant was suctioned. No mec. Delayed cord clamping and infant was passed to mother. Cord clamped and cut. Due to adherence of placenta, placenta delivered manually with a 3 vessel cord. Bedside ultrasound showing thin endometrial stripe. Vaginal exam revealed an intact cervix, vaginal walls and sulci. Patient had a first degree laceration in the perineum that was repaired with 2-0 and 3-0 chromic suture and a R labial laceration that was repaired with 3-0 chromic suture. Fundal massage was given and uterine fundus was found to have intermittent atony. Due to intermittent atony, IM methergine, TXA, IM pitocin, and rectal cytotec administered. Due to continued uterine bleeding and intermittent atony, decision made to place DIPAK. Anesthesia called to bedside for second IV placement, and STAT CBC collected. Excellent hemostasis was noted. Refer to DIPAK note for further details. Patient was stable and went to recovery. Count was correct x 2.       Delisa Yeung, PGY-1 Spontaneous vaginal delivery of liveborn infant from OA position. Head, shoulders, and body delivered easily. Infant was suctioned. No mec. Delayed cord clamping and infant was passed to mother. Cord clamped and cut. Due to adherence of placenta, placenta delivered manually with a 3 vessel cord. Ancef 2g administered. Bedside ultrasound showing thin endometrial stripe. Vaginal exam revealed an intact cervix, vaginal walls and sulci. Patient had a first degree laceration in the perineum that was repaired with 2-0 and 3-0 chromic suture and a R labial laceration that was repaired with 3-0 chromic suture. Fundal massage was given and uterine fundus was found to have intermittent atony. Due to intermittent atony, IM methergine, TXA, IM pitocin, and rectal cytotec administered. Due to continued uterine bleeding and intermittent atony, decision made to place DIPAK. Anesthesia called to bedside for second IV placement, and STAT CBC collected. Excellent hemostasis was noted. Refer to DIPAK note for further details. Patient was stable and went to recovery. Count was correct x 2.       Delisa Yeung, PGY-1 Spontaneous vaginal delivery of liveborn infant from OA position. Head, shoulders, and body delivered easily. Infant was suctioned. No mec. Delayed cord clamping and infant was passed to mother. Cord clamped and cut. Due to adherence of placenta, placenta delivered manually with a 3 vessel cord. Ancef 2g administered. Bedside ultrasound showing thin endometrial stripe. Vaginal exam revealed an intact cervix, vaginal walls and sulci. Patient had a first degree laceration in the perineum that was repaired with 2-0 and 3-0 chromic suture and a R labial laceration that was repaired with 3-0 chromic suture. Fundal massage was given and uterine fundus was found to be atonic. IM methergine, TXA, IM pitocin, and rectal cytotec administered. Due to continued uterine bleeding and intermittent atony, decision made to place DIPAK. Anesthesia called to bedside for second IV placement, and STAT CBC collected. Excellent hemostasis was noted. Refer to DIPAK note for further details. Patient was stable and went to recovery. Count was correct x 2.       Delisa Yeung, PGY-1

## 2024-03-28 NOTE — OB RN DELIVERY SUMMARY - NS_SEPSISRSKCALC_OBGYN_ALL_OB_FT
EOS calculated successfully. EOS Risk Factor: 0.5/1000 live births ([facility] national incidence); GA=40w5d; Temp=98.96; ROM=9.6; GBS='Negative'; Antibiotics='No antibiotics or any antibiotics < 2 hrs prior to birth'

## 2024-03-29 ENCOUNTER — TRANSCRIPTION ENCOUNTER (OUTPATIENT)
Age: 29
End: 2024-03-29

## 2024-03-29 VITALS
OXYGEN SATURATION: 99 % | DIASTOLIC BLOOD PRESSURE: 75 MMHG | RESPIRATION RATE: 18 BRPM | SYSTOLIC BLOOD PRESSURE: 106 MMHG | HEART RATE: 97 BPM | TEMPERATURE: 98 F

## 2024-03-29 PROCEDURE — 59050 FETAL MONITOR W/REPORT: CPT

## 2024-03-29 PROCEDURE — 86780 TREPONEMA PALLIDUM: CPT

## 2024-03-29 PROCEDURE — 85025 COMPLETE CBC W/AUTO DIFF WBC: CPT

## 2024-03-29 PROCEDURE — 86900 BLOOD TYPING SEROLOGIC ABO: CPT

## 2024-03-29 PROCEDURE — 85460 HEMOGLOBIN FETAL: CPT

## 2024-03-29 PROCEDURE — 86901 BLOOD TYPING SEROLOGIC RH(D): CPT

## 2024-03-29 PROCEDURE — 36415 COLL VENOUS BLD VENIPUNCTURE: CPT

## 2024-03-29 PROCEDURE — 94640 AIRWAY INHALATION TREATMENT: CPT

## 2024-03-29 PROCEDURE — 86870 RBC ANTIBODY IDENTIFICATION: CPT

## 2024-03-29 PROCEDURE — 86850 RBC ANTIBODY SCREEN: CPT

## 2024-03-29 RX ORDER — FERROUS SULFATE 325(65) MG
1 TABLET ORAL
Qty: 0 | Refills: 0 | DISCHARGE
Start: 2024-03-29

## 2024-03-29 RX ORDER — ALBUTEROL 90 UG/1
2 AEROSOL, METERED ORAL
Qty: 0 | Refills: 0 | DISCHARGE
Start: 2024-03-29

## 2024-03-29 RX ORDER — ASCORBIC ACID 60 MG
1 TABLET,CHEWABLE ORAL
Qty: 0 | Refills: 0 | DISCHARGE
Start: 2024-03-29

## 2024-03-29 RX ORDER — DIBUCAINE 1 %
1 OINTMENT (GRAM) RECTAL
Qty: 0 | Refills: 0 | DISCHARGE
Start: 2024-03-29

## 2024-03-29 RX ORDER — TIOTROPIUM BROMIDE 18 UG/1
2 CAPSULE ORAL; RESPIRATORY (INHALATION)
Qty: 0 | Refills: 0 | DISCHARGE
Start: 2024-03-29

## 2024-03-29 RX ORDER — ACETAMINOPHEN 500 MG
3 TABLET ORAL
Qty: 0 | Refills: 0 | DISCHARGE
Start: 2024-03-29

## 2024-03-29 RX ORDER — BENZOCAINE 10 %
1 GEL (GRAM) MUCOUS MEMBRANE
Qty: 0 | Refills: 0 | DISCHARGE
Start: 2024-03-29

## 2024-03-29 RX ORDER — IBUPROFEN 200 MG
1 TABLET ORAL
Qty: 0 | Refills: 0 | DISCHARGE
Start: 2024-03-29

## 2024-03-29 RX ADMIN — Medication 975 MILLIGRAM(S): at 10:15

## 2024-03-29 RX ADMIN — Medication 1 TABLET(S): at 13:18

## 2024-03-29 RX ADMIN — Medication 975 MILLIGRAM(S): at 02:30

## 2024-03-29 RX ADMIN — Medication 975 MILLIGRAM(S): at 09:42

## 2024-03-29 RX ADMIN — Medication 600 MILLIGRAM(S): at 06:21

## 2024-03-29 RX ADMIN — Medication 500 MILLIGRAM(S): at 13:17

## 2024-03-29 RX ADMIN — Medication 325 MILLIGRAM(S): at 13:17

## 2024-03-29 RX ADMIN — Medication 975 MILLIGRAM(S): at 02:00

## 2024-03-29 RX ADMIN — Medication 600 MILLIGRAM(S): at 13:16

## 2024-03-29 RX ADMIN — Medication 600 MILLIGRAM(S): at 05:51

## 2024-03-29 NOTE — PROGRESS NOTE ADULT - SUBJECTIVE AND OBJECTIVE BOX
OB Progress Note:  PPD#2    S: 28yo on PPD#2 s/p  c/b PPH and manual placental extraction, now s/p DIPAK. Patient feels well. Pain is well controlled. She is tolerating a regular diet and passing flatus. She is voiding spontaneously, and ambulating without difficulty. Denies CP/SOB. Denies headaches, blurry vision, epigastric pain, lightheadedness/dizziness. Denies N/V.    O:  Vitals:   Vital Signs Last 24 Hrs  T(C): 36.6 (29 Mar 2024 06:09), Max: 37 (28 Mar 2024 17:43)  T(F): 97.9 (29 Mar 2024 06:09), Max: 98.6 (28 Mar 2024 17:43)  HR: 97 (29 Mar 2024 06:09) (89 - 100)  BP: 106/75 (29 Mar 2024 06:09) (106/75 - 109/76)  BP(mean): --  RR: 18 (29 Mar 2024 06:09) (18 - 18)  SpO2: 99% (29 Mar 2024 06:09) (98% - 99%)    Parameters below as of 29 Mar 2024 06:09  Patient On (Oxygen Delivery Method): room air        MEDICATIONS  (STANDING):  acetaminophen     Tablet .. 975 milliGRAM(s) Oral <User Schedule>  ascorbic acid 500 milliGRAM(s) Oral daily  budesonide 160 MICROgram(s)/formoterol 4.5 MICROgram(s) Inhaler 2 Puff(s) Inhalation two times a day  diphtheria/tetanus/pertussis (acellular) Vaccine (Adacel) 0.5 milliLiter(s) IntraMuscular once  ferrous    sulfate 325 milliGRAM(s) Oral daily  ibuprofen  Tablet. 600 milliGRAM(s) Oral every 6 hours  ketorolac   Injectable 30 milliGRAM(s) IV Push once  oxytocin Infusion 41.667 milliUNIT(s)/Min (125 mL/Hr) IV Continuous <Continuous>  oxytocin Infusion. 4 milliUNIT(s)/Min (4 mL/Hr) IV Continuous <Continuous>  prenatal multivitamin 1 Tablet(s) Oral daily  sodium chloride 0.9% lock flush 3 milliLiter(s) IV Push every 8 hours  tiotropium 2.5 MICROgram(s) Inhaler 2 Puff(s) Inhalation at bedtime    MEDICATIONS  (PRN):  albuterol    90 MICROgram(s) HFA Inhaler 2 Puff(s) Inhalation every 6 hours PRN Shortness of Breath  benzocaine 20%/menthol 0.5% Spray 1 Spray(s) Topical every 6 hours PRN for Perineal discomfort  dibucaine 1% Ointment 1 Application(s) Topical every 6 hours PRN Perineal discomfort  diphenhydrAMINE 25 milliGRAM(s) Oral every 6 hours PRN Pruritus  hydrocortisone 1% Cream 1 Application(s) Topical every 6 hours PRN Moderate Pain (4-6)  lanolin Ointment 1 Application(s) Topical every 6 hours PRN nipple soreness  magnesium hydroxide Suspension 30 milliLiter(s) Oral two times a day PRN Constipation  oxyCODONE    IR 5 milliGRAM(s) Oral once PRN Moderate to Severe Pain (4-10)  oxyCODONE    IR 5 milliGRAM(s) Oral every 3 hours PRN Moderate to Severe Pain (4-10)  pramoxine 1%/zinc 5% Cream 1 Application(s) Topical every 4 hours PRN Moderate Pain (4-6)  simethicone 80 milliGRAM(s) Chew every 4 hours PRN Gas  witch hazel Pads 1 Application(s) Topical every 4 hours PRN Perineal discomfort      Labs:  Blood type: A Negative  Rubella IgG: RPR: Negative                          9.1<L>   36.86<H> >-----------< 127<L>    (  @ 06:19 )             26.4<L>                        11.3<L>   26.71<H> >-----------< 230    (  @ 23:57 )             33.8<L>                        12.2   13.57<H> >-----------< 282    (  @ 14:08 )             35.6                  Physical Exam:  General: NAD  Abdomen: soft, non-tender, non-distended, fundus firm  Vaginal: Lochia wnl  Extremities: No erythema/edema  
OB Progress Note:  PPD#1    S: 28yo on PPD#1 s/p  c/b PPH and manual placental extraction, now s/p DIPAK. Patient feels well. Pain is well controlled. She is tolerating a regular diet and passing flatus. She is voiding spontaneously, and ambulating without difficulty. Denies CP/SOB. Denies headaches, blurry vision, epigastric pain, lightheadedness/dizziness. Denies N/V.      O:  Vitals:  Vital Signs Last 24 Hrs  T(C): 36.7 (28 Mar 2024 04:37), Max: 37.6 (28 Mar 2024 02:45)  T(F): 98.1 (28 Mar 2024 04:37), Max: 99.7 (28 Mar 2024 02:45)  HR: 101 (28 Mar 2024 04:37) (58 - 176)  BP: 114/79 (28 Mar 2024 04:37) (97/58 - 144/80)  BP(mean): --  RR: 18 (28 Mar 2024 04:37) (16 - 19)  SpO2: 96% (28 Mar 2024 04:37) (78% - 100%)    Parameters below as of 28 Mar 2024 04:37  Patient On (Oxygen Delivery Method): room air        MEDICATIONS  (STANDING):  acetaminophen     Tablet .. 975 milliGRAM(s) Oral <User Schedule>  budesonide 160 MICROgram(s)/formoterol 4.5 MICROgram(s) Inhaler 2 Puff(s) Inhalation two times a day  diphtheria/tetanus/pertussis (acellular) Vaccine (Adacel) 0.5 milliLiter(s) IntraMuscular once  ibuprofen  Tablet. 600 milliGRAM(s) Oral every 6 hours  ketorolac   Injectable 30 milliGRAM(s) IV Push once  methylergonovine Injectable 0.2 milliGRAM(s) IntraMuscular every 4 hours  oxytocin Infusion 41.667 milliUNIT(s)/Min (125 mL/Hr) IV Continuous <Continuous>  oxytocin Infusion. 4 milliUNIT(s)/Min (4 mL/Hr) IV Continuous <Continuous>  prenatal multivitamin 1 Tablet(s) Oral daily  sodium chloride 0.9% lock flush 3 milliLiter(s) IV Push every 8 hours  tiotropium 2.5 MICROgram(s) Inhaler 2 Puff(s) Inhalation at bedtime      Labs:  Blood type: A Negative  Rubella IgG: RPR: Negative                          9.1<L>   36.86<H> >-----------< --    (  @ 06:19 )             26.4<L>                        11.3<L>   26.71<H> >-----------< 230    (  @ 23:57 )             33.8<L>                        12.2   13.57<H> >-----------< 282    (  @ 14:08 )             35.6                  Physical Exam:  General: NAD  Abdomen: soft, non-tender, non-distended, fundus firm  Vaginal: Minimal lochia  Extremities: No erythema/edema

## 2024-03-29 NOTE — PROGRESS NOTE ADULT - ATTENDING COMMENTS
30yo on PPD#2 s/p  c/b PPH and manual placental extraction, now s/p DIPAK.  Patient is stable and doing well post-partum.    routine postpartum care  continue oral iron supplementation  discharge home    Ayanna Winkler MD
OB Attg:  Pt seen and assessed. I agree with above assessment and plan.   IAN Watt MD

## 2024-03-29 NOTE — DISCHARGE NOTE OB - CARE PLAN
Principal Discharge DX:	Vaginal delivery  Assessment and plan of treatment:	nothing in the vagina (no sex, tampons, swimming, tub baths), follow up in office in 6 weeks  Secondary Diagnosis:	Anemia due to acute blood loss  Assessment and plan of treatment:	continue daily iron supplement with Vitamin C   1

## 2024-03-29 NOTE — DISCHARGE NOTE OB - CARE PROVIDER_API CALL
Ayanna Winkler  Obstetrics and Gynecology  [address] [place] [address], [address]  [place] [address]  Phone: [phone]  Fax: [phone]  Follow Up Time:

## 2024-03-29 NOTE — DISCHARGE NOTE OB - HOSPITAL COURSE
30yo P0 admitted at term for induction of labor for oligohydramnios, patient delivered by  complicated by postpartum hemorrhage.  Postpartum course complicated by anemia secondary to acute blood loss.  patient discharged home PPD#2.

## 2024-03-29 NOTE — DISCHARGE NOTE OB - MEDICATION SUMMARY - MEDICATIONS TO TAKE
I will START or STAY ON the medications listed below when I get home from the hospital:    ibuprofen 600 mg oral tablet  -- 1 tab(s) by mouth every 6 hours  -- Indication: For moderate pain    acetaminophen 325 mg oral tablet  -- 3 tab(s) by mouth every 6 hours as needed for  mild pain  -- Indication: For mild pain    albuterol 90 mcg/inh inhalation aerosol  -- 2 puff(s) inhaled every 6 hours As needed Shortness of Breath  -- Indication: For asthma    tiotropium 2.5 mcg/inh inhalation aerosol  -- 2 puff(s) inhaled once a day (at bedtime)  -- Indication: For asthma    benzocaine 20% topical spray  -- 1 Apply on skin to affected area every 6 hours As needed for Perineal discomfort  -- Indication: For perineal pain    dibucaine 1% topical ointment  -- 1 Apply on skin to affected area every 6 hours As needed Perineal discomfort  -- Indication: For perineal pain    Prenatal Multivitamins with Folic Acid 1 mg oral tablet  -- 1 tab(s) by mouth once a day  -- Indication: For nutrition    ferrous sulfate 325 mg (65 mg elemental iron) oral tablet  -- 1 tab(s) by mouth once a day  -- Indication: For anemia    ascorbic acid 500 mg oral tablet  -- 1 tab(s) by mouth once a day  -- Indication: For iron absorption

## 2024-03-29 NOTE — PROGRESS NOTE ADULT - ASSESSMENT
A/P: 30yo PPD#1 s/p  c/b PPH and manual placental extraction, now s/p DIPAK.  Patient is stable and doing well post-partum.     #PPH  - EBL 1060 total, now s/p DIPAK  - AM Hct: 35.6->33.8->26.4  - firm fundus, minimal lochia, VSS  - ordered Fe, VitC. Will continue to monitor      #Postpartum care  - Pain well controlled, continue current pain regimen  - Increase ambulation, SCDs when not ambulating  - Continue regular diet    Marbella Martinez, PGY1
  A/P: 28yo on PPD#2 s/p  c/b PPH and manual placental extraction, now s/p DIPAK.  Patient is stable and doing well post-partum.      #PPH  - EBL 1060 total, now s/p DIPAK  - AM Hct: 35.6->33.8->26.4 yest  - firm fundus, minimal lochia, VSS  - ordered Fe, VitC. Will continue to monitor    #Postpartum care  - Pain well controlled, continue current pain regimen  - Increase ambulation, SCDs when not ambulating  - Continue regular diet  - Discharge planning    Marbella Martinez PGY1

## 2024-03-29 NOTE — DISCHARGE NOTE OB - PATIENT PORTAL LINK FT
You can access the FollowMyHealth Patient Portal offered by [facility] by registering at the following website: [email or web address]. By joining [employer]’s FollowMyHealth portal, you will also be able to view your health information using other applications (apps) compatible with our system.

## 2024-03-29 NOTE — DISCHARGE NOTE OB - PLAN OF CARE
continue daily iron supplement with Vitamin C nothing in the vagina (no sex, tampons, swimming, tub baths), follow up in office in 6 weeks

## 2024-04-01 ENCOUNTER — NON-APPOINTMENT (OUTPATIENT)
Age: 29
End: 2024-04-01

## 2024-04-01 ENCOUNTER — EMERGENCY (EMERGENCY)
Facility: HOSPITAL | Age: 29
LOS: 0 days | Discharge: HOME | End: 2024-04-02
Attending: EMERGENCY MEDICINE
Payer: COMMERCIAL

## 2024-04-01 VITALS
WEIGHT: 149.91 LBS | RESPIRATION RATE: 20 BRPM | TEMPERATURE: 98 F | DIASTOLIC BLOOD PRESSURE: 83 MMHG | SYSTOLIC BLOOD PRESSURE: 125 MMHG | HEART RATE: 84 BPM | HEIGHT: 63 IN | OXYGEN SATURATION: 100 %

## 2024-04-01 DIAGNOSIS — R07.89 OTHER CHEST PAIN: ICD-10-CM

## 2024-04-01 DIAGNOSIS — I08.1 RHEUMATIC DISORDERS OF BOTH MITRAL AND TRICUSPID VALVES: ICD-10-CM

## 2024-04-01 DIAGNOSIS — R00.2 PALPITATIONS: ICD-10-CM

## 2024-04-01 DIAGNOSIS — S20.02XA CONTUSION OF LEFT BREAST, INITIAL ENCOUNTER: ICD-10-CM

## 2024-04-01 DIAGNOSIS — R06.02 SHORTNESS OF BREATH: ICD-10-CM

## 2024-04-01 DIAGNOSIS — R79.1 ABNORMAL COAGULATION PROFILE: ICD-10-CM

## 2024-04-01 DIAGNOSIS — Z98.890 OTHER SPECIFIED POSTPROCEDURAL STATES: Chronic | ICD-10-CM

## 2024-04-01 DIAGNOSIS — R60.0 LOCALIZED EDEMA: ICD-10-CM

## 2024-04-01 DIAGNOSIS — R79.89 OTHER SPECIFIED ABNORMAL FINDINGS OF BLOOD CHEMISTRY: ICD-10-CM

## 2024-04-01 DIAGNOSIS — J45.909 UNSPECIFIED ASTHMA, UNCOMPLICATED: ICD-10-CM
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
## 2024-04-01 DIAGNOSIS — M79.605 PAIN IN LEFT LEG: ICD-10-CM

## 2024-04-01 PROBLEM — Z87.19 PERSONAL HISTORY OF OTHER DISEASES OF THE DIGESTIVE SYSTEM: Chronic | Status: ACTIVE | Noted: 2024-03-26

## 2024-04-01 LAB
ALBUMIN SERPL ELPH-MCNC: 2.6 G/DL — LOW (ref 3.3–5)
ALLERGY+IMMUNOLOGY DIAG STUDY NOTE: SIGNIFICANT CHANGE UP
ALP SERPL-CCNC: 149 U/L — HIGH (ref 40–120)
ALT FLD-CCNC: 23 U/L — SIGNIFICANT CHANGE UP (ref 12–78)
ANION GAP SERPL CALC-SCNC: 6 MMOL/L — SIGNIFICANT CHANGE UP (ref 5–17)
ANISOCYTOSIS BLD QL: SLIGHT — SIGNIFICANT CHANGE UP
APPEARANCE UR: CLEAR — SIGNIFICANT CHANGE UP
APTT BLD: 28.9 SEC — SIGNIFICANT CHANGE UP (ref 24.5–35.6)
AST SERPL-CCNC: 26 U/L — SIGNIFICANT CHANGE UP (ref 15–37)
BACTERIA # UR AUTO: NEGATIVE /HPF — SIGNIFICANT CHANGE UP
BASOPHILS # BLD AUTO: 0.37 K/UL — HIGH (ref 0–0.2)
BASOPHILS NFR BLD AUTO: 2 % — SIGNIFICANT CHANGE UP (ref 0–2)
BILIRUB SERPL-MCNC: 0.3 MG/DL — SIGNIFICANT CHANGE UP (ref 0.2–1.2)
BILIRUB UR-MCNC: NEGATIVE — SIGNIFICANT CHANGE UP
BLD GP AB SCN SERPL QL: SIGNIFICANT CHANGE UP
BUN SERPL-MCNC: 8 MG/DL — SIGNIFICANT CHANGE UP (ref 7–23)
CALCIUM SERPL-MCNC: 8.8 MG/DL — SIGNIFICANT CHANGE UP (ref 8.5–10.1)
CAST: 0 /LPF — SIGNIFICANT CHANGE UP (ref 0–4)
CHLORIDE SERPL-SCNC: 111 MMOL/L — HIGH (ref 96–108)
CO2 SERPL-SCNC: 23 MMOL/L — SIGNIFICANT CHANGE UP (ref 22–31)
COLOR SPEC: YELLOW — SIGNIFICANT CHANGE UP
CREAT SERPL-MCNC: 0.77 MG/DL — SIGNIFICANT CHANGE UP (ref 0.5–1.3)
D DIMER BLD IA.RAPID-MCNC: 2508 NG/ML DDU — HIGH
DIFF PNL FLD: ABNORMAL
DIR ANTIGLOB POLYSPECIFIC INTERPRETATION: SIGNIFICANT CHANGE UP
EGFR: 106 ML/MIN/1.73M2 — SIGNIFICANT CHANGE UP
EOSINOPHIL # BLD AUTO: 0.37 K/UL — SIGNIFICANT CHANGE UP (ref 0–0.5)
EOSINOPHIL NFR BLD AUTO: 2 % — SIGNIFICANT CHANGE UP (ref 0–6)
GLUCOSE SERPL-MCNC: 83 MG/DL — SIGNIFICANT CHANGE UP (ref 70–99)
GLUCOSE UR QL: NEGATIVE MG/DL — SIGNIFICANT CHANGE UP
HCT VFR BLD CALC: 22 % — LOW (ref 34.5–45)
HGB BLD-MCNC: 7.2 G/DL — LOW (ref 11.5–15.5)
HYPOCHROMIA BLD QL: SLIGHT — SIGNIFICANT CHANGE UP
INR BLD: 0.9 RATIO — SIGNIFICANT CHANGE UP (ref 0.85–1.18)
KETONES UR-MCNC: ABNORMAL MG/DL
LEUKOCYTE ESTERASE UR-ACNC: ABNORMAL
LYMPHOCYTES # BLD AUTO: 13 % — SIGNIFICANT CHANGE UP (ref 13–44)
LYMPHOCYTES # BLD AUTO: 2.38 K/UL — SIGNIFICANT CHANGE UP (ref 1–3.3)
MAGNESIUM SERPL-MCNC: 2.2 MG/DL — SIGNIFICANT CHANGE UP (ref 1.6–2.6)
MANUAL SMEAR VERIFICATION: SIGNIFICANT CHANGE UP
MCHC RBC-ENTMCNC: 31.2 PG — SIGNIFICANT CHANGE UP (ref 27–34)
MCHC RBC-ENTMCNC: 32.7 GM/DL — SIGNIFICANT CHANGE UP (ref 32–36)
MCV RBC AUTO: 95.2 FL — SIGNIFICANT CHANGE UP (ref 80–100)
MONOCYTES # BLD AUTO: 0.73 K/UL — SIGNIFICANT CHANGE UP (ref 0–0.9)
MONOCYTES NFR BLD AUTO: 4 % — SIGNIFICANT CHANGE UP (ref 2–14)
MYELOCYTES NFR BLD: 4 % — HIGH (ref 0–0)
NEUTROPHILS # BLD AUTO: 13.7 K/UL — HIGH (ref 1.8–7.4)
NEUTROPHILS NFR BLD AUTO: 63 % — SIGNIFICANT CHANGE UP (ref 43–77)
NEUTS BAND # BLD: 12 % — HIGH (ref 0–8)
NITRITE UR-MCNC: NEGATIVE — SIGNIFICANT CHANGE UP
NRBC # BLD: 0 /100 WBCS — SIGNIFICANT CHANGE UP (ref 0–0)
NRBC # BLD: SIGNIFICANT CHANGE UP /100 WBCS (ref 0–0)
NT-PROBNP SERPL-SCNC: 909 PG/ML — HIGH (ref 0–125)
PH UR: 7 — SIGNIFICANT CHANGE UP (ref 5–8)
PLAT MORPH BLD: NORMAL — SIGNIFICANT CHANGE UP
PLATELET # BLD AUTO: 322 K/UL — SIGNIFICANT CHANGE UP (ref 150–400)
POLYCHROMASIA BLD QL SMEAR: SLIGHT — SIGNIFICANT CHANGE UP
POTASSIUM SERPL-MCNC: 4.3 MMOL/L — SIGNIFICANT CHANGE UP (ref 3.5–5.3)
POTASSIUM SERPL-SCNC: 4.3 MMOL/L — SIGNIFICANT CHANGE UP (ref 3.5–5.3)
PROT SERPL-MCNC: 6.7 GM/DL — SIGNIFICANT CHANGE UP (ref 6–8.3)
PROT UR-MCNC: NEGATIVE MG/DL — SIGNIFICANT CHANGE UP
PROTHROM AB SERPL-ACNC: 10.2 SEC — SIGNIFICANT CHANGE UP (ref 9.5–13)
RBC # BLD: 2.31 M/UL — LOW (ref 3.8–5.2)
RBC # FLD: 13.8 % — SIGNIFICANT CHANGE UP (ref 10.3–14.5)
RBC BLD AUTO: ABNORMAL
RBC CASTS # UR COMP ASSIST: 0 /HPF — SIGNIFICANT CHANGE UP (ref 0–4)
SODIUM SERPL-SCNC: 140 MMOL/L — SIGNIFICANT CHANGE UP (ref 135–145)
SP GR SPEC: <1.005 — LOW (ref 1–1.03)
SQUAMOUS # UR AUTO: 0 /HPF — SIGNIFICANT CHANGE UP (ref 0–5)
TROPONIN I, HIGH SENSITIVITY RESULT: 6.88 NG/L — SIGNIFICANT CHANGE UP
UROBILINOGEN FLD QL: 0.2 MG/DL — SIGNIFICANT CHANGE UP (ref 0.2–1)
WBC # BLD: 18.27 K/UL — HIGH (ref 3.8–10.5)
WBC # FLD AUTO: 18.27 K/UL — HIGH (ref 3.8–10.5)
WBC UR QL: 3 /HPF — SIGNIFICANT CHANGE UP (ref 0–5)

## 2024-04-01 PROCEDURE — 86880 COOMBS TEST DIRECT: CPT

## 2024-04-01 PROCEDURE — 85379 FIBRIN DEGRADATION QUANT: CPT

## 2024-04-01 PROCEDURE — 36430 TRANSFUSION BLD/BLD COMPNT: CPT

## 2024-04-01 PROCEDURE — 93005 ELECTROCARDIOGRAM TRACING: CPT

## 2024-04-01 PROCEDURE — 85018 HEMOGLOBIN: CPT

## 2024-04-01 PROCEDURE — 86922 COMPATIBILITY TEST ANTIGLOB: CPT

## 2024-04-01 PROCEDURE — P9016: CPT

## 2024-04-01 PROCEDURE — 81001 URINALYSIS AUTO W/SCOPE: CPT

## 2024-04-01 PROCEDURE — 84484 ASSAY OF TROPONIN QUANT: CPT

## 2024-04-01 PROCEDURE — 85610 PROTHROMBIN TIME: CPT

## 2024-04-01 PROCEDURE — 83735 ASSAY OF MAGNESIUM: CPT

## 2024-04-01 PROCEDURE — 96374 THER/PROPH/DIAG INJ IV PUSH: CPT | Mod: XU

## 2024-04-01 PROCEDURE — 99284 EMERGENCY DEPT VISIT MOD MDM: CPT

## 2024-04-01 PROCEDURE — 93010 ELECTROCARDIOGRAM REPORT: CPT

## 2024-04-01 PROCEDURE — 83880 ASSAY OF NATRIURETIC PEPTIDE: CPT

## 2024-04-01 PROCEDURE — 76770 US EXAM ABDO BACK WALL COMP: CPT

## 2024-04-01 PROCEDURE — 93306 TTE W/DOPPLER COMPLETE: CPT

## 2024-04-01 PROCEDURE — 85730 THROMBOPLASTIN TIME PARTIAL: CPT

## 2024-04-01 PROCEDURE — 86920 COMPATIBILITY TEST SPIN: CPT

## 2024-04-01 PROCEDURE — 85014 HEMATOCRIT: CPT

## 2024-04-01 PROCEDURE — 86870 RBC ANTIBODY IDENTIFICATION: CPT

## 2024-04-01 PROCEDURE — 99285 EMERGENCY DEPT VISIT HI MDM: CPT

## 2024-04-01 PROCEDURE — 86900 BLOOD TYPING SEROLOGIC ABO: CPT

## 2024-04-01 PROCEDURE — 93970 EXTREMITY STUDY: CPT

## 2024-04-01 PROCEDURE — 71275 CT ANGIOGRAPHY CHEST: CPT | Mod: MC

## 2024-04-01 PROCEDURE — 93970 EXTREMITY STUDY: CPT | Mod: 26

## 2024-04-01 PROCEDURE — 85025 COMPLETE CBC W/AUTO DIFF WBC: CPT

## 2024-04-01 PROCEDURE — 93306 TTE W/DOPPLER COMPLETE: CPT | Mod: 26

## 2024-04-01 PROCEDURE — 86850 RBC ANTIBODY SCREEN: CPT

## 2024-04-01 PROCEDURE — 71275 CT ANGIOGRAPHY CHEST: CPT | Mod: 26,MC

## 2024-04-01 PROCEDURE — 36415 COLL VENOUS BLD VENIPUNCTURE: CPT

## 2024-04-01 PROCEDURE — 86921 COMPATIBILITY TEST INCUBATE: CPT

## 2024-04-01 PROCEDURE — 80053 COMPREHEN METABOLIC PANEL: CPT

## 2024-04-01 PROCEDURE — 76770 US EXAM ABDO BACK WALL COMP: CPT | Mod: 26

## 2024-04-01 PROCEDURE — 99285 EMERGENCY DEPT VISIT HI MDM: CPT | Mod: 25

## 2024-04-01 PROCEDURE — 86901 BLOOD TYPING SEROLOGIC RH(D): CPT

## 2024-04-01 RX ORDER — ONDANSETRON 8 MG/1
4 TABLET, FILM COATED ORAL ONCE
Refills: 0 | Status: COMPLETED | OUTPATIENT
Start: 2024-04-01 | End: 2024-04-01

## 2024-04-01 RX ORDER — IBUPROFEN 200 MG
600 TABLET ORAL ONCE
Refills: 0 | Status: COMPLETED | OUTPATIENT
Start: 2024-04-01 | End: 2024-04-01

## 2024-04-01 RX ADMIN — Medication 600 MILLIGRAM(S): at 21:54

## 2024-04-01 RX ADMIN — ONDANSETRON 4 MILLIGRAM(S): 8 TABLET, FILM COATED ORAL at 23:05

## 2024-04-01 NOTE — ED STATDOCS - NSFOLLOWUPINSTRUCTIONS_ED_ALL_ED_FT
Log Out.  [employer] CareNotes®  :  [facility]        BLOOD TRANSFUSION - General Information    Blood Transfusion    WHAT YOU NEED TO KNOW:    What do I need to know about a blood transfusion? A blood transfusion is used to give you blood through an IV. You may get only part of the blood, such as red blood cells, platelets, or plasma. The blood may be from you and stored for you to use later. The blood may instead be from another person. Donated blood is tested for HIV, hepatitis, syphilis, West Nile virus, and other diseases.    How do I prepare for a blood transfusion?    Your healthcare provider will tell you how to prepare. You will be told if you can eat or drink before the transfusion. Ask if you can drive yourself home. You may need to arrange for a ride.    Tell the healthcare provider if you ever had a fever, itching, swelling, or hives during a blood transfusion. You may be given medicines to help prevent an allergic reaction.    Healthcare providers will take a sample of your blood. They will check that the blood used in the transfusion is right for you. You can get sick if your immune system tries to destroy blood that is not right for you. This is called a blood transfusion reaction. Ask your healthcare provider for more information about blood transfusion reactions.    Your transfusion may last 1 to 4 hours. Ask what you can bring into the transfusion room. You may be able to eat, read, or watch TV. You may also be able to go to the restroom with help.  What happens during a blood transfusion?    An IV will be placed into a large vein, usually in your arm. The bag that contains blood will hang next to your bed or chair. Tubing will connect the blood bag to your IV.    The healthcare provider will open a clamp so the blood can enter your IV. The blood transfusion will start slowly so healthcare providers can watch for signs of a reaction. Even a small amount of donor blood can cause a reaction. A healthcare provider will stay with you for at least 15 minutes after the transfusion starts.    Healthcare providers will check your vital signs at least 1 time every hour. Tell them if you have signs of a reaction, such as pain, nausea, or itching. They will stop the transfusion immediately.  What should I expect after a blood transfusion? You may need to have blood taken to check that your body accepted the donor blood. You will have to stay a short time after the transfusion ends so healthcare providers can watch for signs of a reaction. You may feel some pain or see bruises near the site for a few days after the transfusion. Apply ice to decrease pain and swelling. Use an ice pack, or put ice in a plastic bag and wrap a towel around it. Apply the ice pack or wrapped bag to your transfusion site for 20 minutes each hour or as directed.    What are the risks of a blood transfusion? Fever, chills, or mild allergic reactions can happen within hours of a transfusion. You may develop shortness of breath or other breathing problems. A very rare allergic reaction called anaphylaxis may cause you to go into shock and stop breathing. Some reactions may happen days or weeks later. Examples include bruising, tiredness, or weakness. You may also have a reaction the next time you receive blood.    CARE AGREEMENT:    You have the right to help plan your care. Learn about your health condition and how it may be treated. Discuss treatment options with your healthcare providers to decide what care you want to receive. You always have the right to refuse treatment.    © [employer] 1973, 2024    	  back to top            © [employer] 1973, 2024

## 2024-04-01 NOTE — ED ADULT NURSE NOTE - OBJECTIVE STATEMENT
Patient presents to ER with chest tightness and pain in left leg with leg swelling. Patient has a vaginal delivery 5 days ago.

## 2024-04-01 NOTE — CONSULT NOTE ADULT - SUBJECTIVE AND OBJECTIVE BOX
28yo  s/p  c/p PPH EBL 1000   presents c/o SOB and palpitations which started last night.  Pt states starting last night she began to feel SOB and felt like her heart was racing when she was lying in bed.  Pt called her OB office today and they told her to go to the hospital for further evaluation.  Pt denies chest pain, denies nausea, vomiting, fever or chills.  Pt states currently she just feels like her heart is still racing and she is SOB but unsure if she is just anxious being a new mom.  Pt is tolerating PO, voiding without difficulty, pain is well controlled.  Pt states lochia is minimal and she is changing her pad ever 3-4 hours just to feel clean but not b/c pads are saturated.  Pt is exclusively breastfeeding and otherwise doing well    PNC: Dr Winkler  PNI: uncomplicated pregnancy,  c/b PPH(ebl 1000) requiring multiple uterotonics and amrk placement    - OBhx -   FT     c/b PPH(ebl 1000) requiring multiple uterotonics and mark placement  – GynHx: denies fibroids/cysts/STIs  – PMH: Celiac disease, recurrent UTIs s/p R UPJ surgery in , history of suspected pulmonary hypertension in  diagnosed on TTE, further workup and repeat TTE normal and patient not symptomatic during pregnancy. History of asthma with hospitalization in , no intubation. Follows with Dr. Gutierrez (pulm), well controlled on Albuterol BID, Symbicort 160-4.5 BID, Spiriva qHS.  – PSH: R UPJ surgery in   – Psych: denies anx/dep  – Social: denies t/e/d use   – Meds: PNV, Albuterol BID, Symbicort 160-4.5 BID, Spiriva qHS, claritan, PNV  – Allergies: gluten      T(C): 36.6 (24 @ 12:46), Max: 36.6 (24 @ 12:46)  HR: 84 (24 @ 12:46) (84 - 84)  BP: 125/83 (24 @ 12:46) (125/83 - 125/83)  RR: 20 (24 @ 12:46) (20 - 20)  SpO2: 100% (24 @ 12:46) (100% - 100%)    Gen: NAD  Heart: RRR  Lungs: CTA B/L  Abdomen: Soft NT  Ext: no calf tenderness, b/l 2 + edema                          7.2    18.27 )-----------( 322      ( 2024 13:34 )             22.0     04-    140  |  111<H>  |  8   ----------------------------<  83  4.3   |  23  |  0.77    Ca    8.8      2024 13:34  Mg     2.2     04-    TPro  6.7  /  Alb  2.6<L>  /  TBili  0.3  /  DBili  x   /  AST  26  /  ALT  23  /  AlkPhos  149<H>          LE dopplers  - negative         28yo  s/p  c/p PPH EBL 1000   presents c/o SOB and palpitations which started last night.  Pt states starting last night she began to feel SOB and felt like her heart was racing when she was lying in bed.  Pt called her OB office today and they told her to go to the hospital for further evaluation.  Pt denies chest pain, denies nausea, vomiting, fever or chills.  Pt states currently she just feels like her heart is still racing and she is SOB but unsure if she is just anxious being a new mom.  Pt is tolerating PO, voiding without difficulty, pain is well controlled.  Pt states lochia is minimal and she is changing her pad ever 3-4 hours just to feel clean but not b/c pads are saturated.  Pt is exclusively breastfeeding and otherwise doing well    PNC: Dr Winkler  PNI: uncomplicated pregnancy,  c/b PPH(ebl 1000) requiring multiple uterotonics and mark placement    - OBhx -   FT     c/b PPH(ebl 1000) requiring multiple uterotonics and mark placement  – GynHx: denies fibroids/cysts/STIs  – PMH: Celiac disease, recurrent UTIs s/p R UPJ surgery in , history of suspected pulmonary hypertension in  diagnosed on TTE, further workup and repeat TTE normal and patient not symptomatic during pregnancy. History of asthma with hospitalization in , no intubation. Follows with Dr. Gutierrez (pulm), well controlled on Albuterol BID, Symbicort 160-4.5 BID, Spiriva qHS.  – PSH: R UPJ surgery in   – Psych: denies anx/dep  – Social: denies t/e/d use   – Meds: PNV, Albuterol BID, Symbicort 160-4.5 BID, Spiriva qHS, claritan, PNV  – Allergies: gluten      T(C): 36.6 (24 @ 12:46), Max: 36.6 (24 @ 12:46)  HR: 84 (24 @ 12:46) (84 - 84)  BP: 125/83 (24 @ 12:46) (125/83 - 125/83)  RR: 20 (24 @ 12:46) (20 - 20)  SpO2: 100% (24 @ 12:46) (100% - 100%)    Gen: NAD  Heart: RRR  Lungs: CTA B/L  Abdomen: Soft NT  Ext: no calf tenderness, b/l 2 + edema                          7.2    18.27 )-----------( 322      ( 2024 13:34 )             22.0     04-    140  |  111<H>  |  8   ----------------------------<  83  4.3   |  23  |  0.77    Ca    8.8      2024 13:34  Mg     2.2     04-    TPro  6.7  /  Alb  2.6<L>  /  TBili  0.3  /  DBili  x   /  AST  26  /  ALT  23  /  AlkPhos  149<H>          LE dopplers  - negative

## 2024-04-01 NOTE — ED ADULT NURSE REASSESSMENT NOTE - NS ED NURSE REASSESS COMMENT FT1
pt received from previous RN Cruz. pt alert and orientedx4, vital signs stable. blood transfusion started at 18:00. Pt educated on possible transfusion reactions. Family at bedside. Consent in chart. Pt has no complaints at this time. Breast pump at beside as well. Safety maintained.

## 2024-04-01 NOTE — ED STATDOCS - OBJECTIVE STATEMENT
30 y/o female 5 days post partum  40 weeks vaginal delivery with a PMHx of asthma presents to the ED for chest tightness when laying down at night x days. Pt also reports her legs are swollen and she has left leg pain. Pt also reports bruising to her left breast. Denies fevers, cough. No other complaints at this time.

## 2024-04-01 NOTE — ED ADULT TRIAGE NOTE - CHIEF COMPLAINT QUOTE
pt 5 days post partum  40 weeks  vaginal delivery. c/o chest pain / tightness/ palpitations. pmh: asthma, pulmonary htn. called ob told to keep in ed.

## 2024-04-01 NOTE — ED STATDOCS - PHYSICAL EXAMINATION
Constitutional: NAD AOx3  Eyes: PERRL EOMI  Head: Normocephalic atraumatic  Mouth: MMM  Cardiac: regular rate and rhythm  Resp: Lungs CTAB  GI: Abd s/nd. Mild suprapubic tenderness  MSK: Mild b/l LE edema.   Neuro: CN2-12 grossly intact, PHIPPS x 4  Skin: No visible rashes. 3 small ecchymotic areas on left breast.

## 2024-04-01 NOTE — ED ADULT TRIAGE NOTE - GLASGOW COMA SCALE: BEST VERBAL RESPONSE, MLM
(V5) oriented Vaishali Swan 182 6 [address] E  [place], [address] [facility]    Consent for Operation  Date: __________________                                Time: _______________    1.  I authorize the performance upon Dorina Ackerman the following operation:  Procedure(s): revealed by the pictures or by descriptive texts accompanying them. If the procedure has been videotaped, the surgeon will obtain the original videotape. The hospital will not be responsible for storage or maintenance of this tape.   7. For the purpose of a THAT MY DOCTOR PROVIDED ME WITH THE ABOVE EXPLANATIONS, THAT ALL BLANKS OR STATEMENTS REQUIRING INSERTION OR COMPLETION WERE FILLED IN.     Signature of Patient:   ___________________________    When the patient is a minor or mentally incompetent to give co supplements, and pills I can buy without a prescription (including street drugs/illegal medications). Failure to inform my anesthesiologist about these medicines may increase my risk of anesthetic complications. iv.  If I am allergic to anything or have ha Anesthesiologist Signature     Date   Time  I have discussed the procedure and information above with the patient (or patient’s representative) and answered their questions. The patient or their representative has agreed to have anesthesia services.     ___

## 2024-04-01 NOTE — ED ADULT TRIAGE NOTE - ESI TRIAGE ACUITY LEVEL, MLM
2 [Arthralgia] : arthralgia [Joint Pain] : joint pain [Joint Stiffness] : joint stiffness [Joint Swelling] : joint swelling [Negative] : Heme/Lymph

## 2024-04-01 NOTE — CONSULT NOTE ADULT - NS ATTEND AMEND GEN_ALL_CORE FT
28 yo P1 s/p  c/b PPH, PPD 5, c/o SOB, chest pressure, found to be severely anemic, please give one unit of PRBC, patient denies any heavy bleeding, reports light lochia. Patient appears comfortable saturating well, lungs CTA, mild 2+ pedal edema noted, patient found to have elevated BNP and D-dimer, for echo and CTA to rule out PE/cardiomyopathy, cardio consult appreciated after echo, will follow closely

## 2024-04-01 NOTE — ED STATDOCS - PATIENT PORTAL LINK FT
You can access the FollowMyHealth Patient Portal offered by [facility] by registering at the following website: [email or web address]. By joining [employer]’s FollowMyHealth portal, you will also be able to view your health information using other applications (apps) compatible with our system.

## 2024-04-01 NOTE — ED ADULT NURSE NOTE - NSFALLHARMRISKINTERV_ED_ALL_ED

## 2024-04-01 NOTE — CONSULT NOTE ADULT - ASSESSMENT
A/P 28yo  s/p  PPD # 5 c/b PPH   now presenting with new onset SOB/palpitation   - Pt with anemia hemoglobin 7.2 likely related to PPH --> 1 units PRBCs recommended  - SOB/palpitations -  recommend cardiology consult, echo, CTA for further workup  - will re evaluate after blood transfusion complete and testing resulted.  - Pt exclusively breastfeeding - breast pump at bedside  Pt seen with Dr Marah Dominique PA-C                                 A/P 28yo  s/p  PPD # 5 c/b PPH   now presenting with new onset SOB/palpitation   - Pt with anemia hemoglobin 7.2 likely related to PPH --> 1 units PRBCs recommended, no standing IV fluids please  - SOB/palpitations, elevated BNP and D-dimer -  recommend cardiology consult, echo, CTA for further workup  - will re evaluate after blood transfusion complete and testing resulted.  - Pt exclusively breastfeeding - breast pump at bedside  Pt seen with Dr Marah Dominique PA-C                                 A/P 30yo  s/p  PPD # 5 c/b PPH   now presenting with new onset SOB/palpitation   - Pt with anemia hemoglobin 7.2 likely related to PPH --> 1 units PRBCs recommended, no standing IV fluids please  - SOB/palpitations, elevated BNP and D-dimer -  recommend cardiology consult, echo, CTA for further workup  - will re evaluate after blood transfusion complete and testing resulted.  - Pt exclusively breastfeeding - breast pump at bedside  Pt seen with Dr Marah Dominique PA-C                                Addendum@530p    CTA IMPRESSION:  No pulmonary embolism.    Suggestion left sided hydronephrosis. CT and/or ultrasound recommended   for complete evaluation.      LE doppler IMPRESSION:  No evidence of deep venous thrombosis in either lower extremity.      Echo Impression:   The left ventricle is normal in size, wall thickness, wall motion and   contractility as seen in limited views. Estimated left ventricular   ejection fraction is 60-65%.   Normal appearing right ventricle structure and function.   Mild mitral regurgitation.   Trace tricuspid valve regurgitation.      Plan: repeat CBC 4 hours after 1 unit PRBCs    D/W Dr Marah Dominique PA-C

## 2024-04-02 VITALS
SYSTOLIC BLOOD PRESSURE: 121 MMHG | OXYGEN SATURATION: 100 % | HEART RATE: 67 BPM | TEMPERATURE: 98 F | RESPIRATION RATE: 18 BRPM | DIASTOLIC BLOOD PRESSURE: 77 MMHG

## 2024-04-02 LAB
HCT VFR BLD CALC: 23.6 % — LOW (ref 34.5–45)
HGB BLD-MCNC: 7.8 G/DL — LOW (ref 11.5–15.5)

## 2024-04-02 NOTE — ED PROVIDER NOTE - NSICDXPASTSURGICALHX_GEN_ALL_CORE_FT
"  PAST SURGICAL HISTORY:  History of kidney surgery

## 2024-04-02 NOTE — ED PROVIDER NOTE - CLINICAL SUMMARY MEDICAL DECISION MAKING FREE TEXT BOX
0347: Flynn GALDAMEZ:  signout received from Dr. Ramos pending repeat H&H.  Repeat H&H noted.  Repeat vital signs obtained.  Patient reevaluated she states she is feeling much better.  She has voided multiple times here in the ED.  Discussed with OB attending Dr. Goldstein.  Patient okay for discharge home.

## 2024-04-02 NOTE — ED PROVIDER NOTE - PATIENT PORTAL LINK FT
You can access the FollowMyHealth Patient Portal offered by [facility] by registering at the following website: [email or web address]. By joining [employer]’s FollowMyHealth portal, you will also be able to view your health information using other applications (apps) compatible with our system.

## 2024-04-02 NOTE — ED PROVIDER NOTE - NSFOLLOWUPINSTRUCTIONS_ED_ALL_ED_FT
Please follow-up with your OB/GYN in the next few days.  Please return for any new or recurrent symptoms

## 2024-04-03 ENCOUNTER — APPOINTMENT (OUTPATIENT)
Age: 29
End: 2024-04-03
Payer: COMMERCIAL

## 2024-04-03 PROCEDURE — S9443: CPT | Mod: 95

## 2024-04-04 ENCOUNTER — NON-APPOINTMENT (OUTPATIENT)
Age: 29
End: 2024-04-04

## 2024-04-04 ENCOUNTER — APPOINTMENT (OUTPATIENT)
Dept: OBGYN | Facility: CLINIC | Age: 29
End: 2024-04-04
Payer: COMMERCIAL

## 2024-04-04 DIAGNOSIS — D62 ACUTE POSTHEMORRHAGIC ANEMIA: ICD-10-CM

## 2024-04-04 PROCEDURE — 0503F POSTPARTUM CARE VISIT: CPT

## 2024-04-05 ENCOUNTER — APPOINTMENT (OUTPATIENT)
Age: 29
End: 2024-04-05
Payer: COMMERCIAL

## 2024-04-05 PROCEDURE — S9443: CPT | Mod: 95

## 2024-04-06 NOTE — HISTORY OF PRESENT ILLNESS
[Delivery Date: ___] : on [unfilled] [] : delivered by vaginal delivery [Female] : Delivery History: baby girl [Wt. ___] : weighing [unfilled] [Breastfeeding] : currently nursing [Doing Well] : is doing well [Postpartum Follow Up] : postpartum follow up [FreeTextEntry8] : follow up after ED visit at  for chest pain, found to have severe anemia and transfused 1 u PRBC [de-identified] : Dr. Winkler.  PPH s/p uterotonics and Karla  [de-identified] : breastfeeding.  Pt reports feeling well since ED visit  [de-identified] : - repeat CBC next week, follow up for  week PPA - f/u for 6 weeks postpartun visit  [FreeTextEntry1] : 28 y/o  on 2024 complicated by postpartum hemorrhage. Pt was seen in ED 4/ with chest pain. Pt had transfusion of 1 unit of blood. h/h 7.8/23.6 after transfusion. Pt reports she is doing better. Pt is eating high iron food.

## 2024-04-06 NOTE — END OF VISIT
[FreeTextEntry3] : I, Shaina Resendiz, acted as a scribe on behalf of Dr. Ayanna Winkler on 04/04/2024.   All medical entries made by the scribe were at my, Dr. Ayanna Winkler, direction and personally dictated by me on 04/04/2024 . I have reviewed the chart and agree that the record accurately reflects my personal performance of the history, physical exam, assessment and plan. I have also personally directed, reviewed, and agreed with the chart.

## 2024-04-08 ENCOUNTER — TRANSCRIPTION ENCOUNTER (OUTPATIENT)
Age: 29
End: 2024-04-08

## 2024-04-08 LAB
BASOPHILS # BLD AUTO: 0.1 K/UL
BASOPHILS NFR BLD AUTO: 0.6 %
EOSINOPHIL # BLD AUTO: 0.09 K/UL
EOSINOPHIL NFR BLD AUTO: 0.5 %
HCT VFR BLD CALC: 32.6 %
HGB BLD-MCNC: 10.2 G/DL
IMM GRANULOCYTES NFR BLD AUTO: 3 %
LYMPHOCYTES # BLD AUTO: 2.35 K/UL
LYMPHOCYTES NFR BLD AUTO: 13.1 %
MAN DIFF?: NORMAL
MCHC RBC-ENTMCNC: 30.6 PG
MCHC RBC-ENTMCNC: 31.3 GM/DL
MCV RBC AUTO: 97.9 FL
MONOCYTES # BLD AUTO: 0.8 K/UL
MONOCYTES NFR BLD AUTO: 4.5 %
NEUTROPHILS # BLD AUTO: 14.05 K/UL
NEUTROPHILS NFR BLD AUTO: 78.3 %
PLATELET # BLD AUTO: 646 K/UL
RBC # BLD: 3.33 M/UL
RBC # FLD: 15.3 %
WBC # FLD AUTO: 17.92 K/UL

## 2024-04-22 ENCOUNTER — EMERGENCY (EMERGENCY)
Facility: HOSPITAL | Age: 29
LOS: 0 days | Discharge: HOME | End: 2024-04-23
Attending: STUDENT IN AN ORGANIZED HEALTH CARE EDUCATION/TRAINING PROGRAM
Payer: COMMERCIAL

## 2024-04-22 VITALS — HEIGHT: 63 IN | WEIGHT: 139.99 LBS

## 2024-04-22 VITALS
DIASTOLIC BLOOD PRESSURE: 72 MMHG | RESPIRATION RATE: 18 BRPM | TEMPERATURE: 98 F | HEART RATE: 70 BPM | OXYGEN SATURATION: 98 % | SYSTOLIC BLOOD PRESSURE: 131 MMHG | WEIGHT: 144.18 LBS

## 2024-04-22 DIAGNOSIS — M79.604 PAIN IN RIGHT LEG: ICD-10-CM

## 2024-04-22 DIAGNOSIS — D64.9 ANEMIA, UNSPECIFIED: ICD-10-CM

## 2024-04-22 DIAGNOSIS — R53.83 OTHER FATIGUE: ICD-10-CM

## 2024-04-22 DIAGNOSIS — Z98.890 OTHER SPECIFIED POSTPROCEDURAL STATES: Chronic | ICD-10-CM

## 2024-04-22 DIAGNOSIS — R07.9 CHEST PAIN, UNSPECIFIED: ICD-10-CM

## 2024-04-22 DIAGNOSIS — R10.2 PELVIC AND PERINEAL PAIN: ICD-10-CM

## 2024-04-22 PROCEDURE — 71045 X-RAY EXAM CHEST 1 VIEW: CPT

## 2024-04-22 PROCEDURE — 87086 URINE CULTURE/COLONY COUNT: CPT

## 2024-04-22 PROCEDURE — 99053 MED SERV 10PM-8AM 24 HR FAC: CPT

## 2024-04-22 PROCEDURE — 93010 ELECTROCARDIOGRAM REPORT: CPT

## 2024-04-22 PROCEDURE — 81001 URINALYSIS AUTO W/SCOPE: CPT

## 2024-04-22 PROCEDURE — 99285 EMERGENCY DEPT VISIT HI MDM: CPT | Mod: 25

## 2024-04-22 PROCEDURE — 80053 COMPREHEN METABOLIC PANEL: CPT

## 2024-04-22 PROCEDURE — 87186 SC STD MICRODIL/AGAR DIL: CPT

## 2024-04-22 PROCEDURE — 99285 EMERGENCY DEPT VISIT HI MDM: CPT

## 2024-04-22 PROCEDURE — 93005 ELECTROCARDIOGRAM TRACING: CPT

## 2024-04-22 PROCEDURE — 85025 COMPLETE CBC W/AUTO DIFF WBC: CPT

## 2024-04-22 PROCEDURE — 84484 ASSAY OF TROPONIN QUANT: CPT

## 2024-04-22 PROCEDURE — 93970 EXTREMITY STUDY: CPT

## 2024-04-22 PROCEDURE — 36415 COLL VENOUS BLD VENIPUNCTURE: CPT

## 2024-04-22 NOTE — ED ADULT TRIAGE NOTE - CHIEF COMPLAINT QUOTE
Pt presents to ED c/o chest pain when lying down and right thigh shooting pain . Pt is 3.5 week post partum and was told to come in by her MD to r/o blood clots. Pt also reports she had to have a blood transfusion 5 days post partum. no other pmh. Pt presents to ED c/o chest pain when lying down and right thigh shooting pain x 2 days. Pt is 3.5 week post partum and was told to come in by her MD to r/o blood clots. Pt also reports she had to have a blood transfusion 5 days post partum. no other pmh.

## 2024-04-23 ENCOUNTER — NON-APPOINTMENT (OUTPATIENT)
Age: 29
End: 2024-04-23

## 2024-04-23 LAB
ALBUMIN SERPL ELPH-MCNC: 3.8 G/DL — SIGNIFICANT CHANGE UP (ref 3.3–5)
ALP SERPL-CCNC: 112 U/L — SIGNIFICANT CHANGE UP (ref 40–120)
ALT FLD-CCNC: 18 U/L — SIGNIFICANT CHANGE UP (ref 12–78)
ANION GAP SERPL CALC-SCNC: 3 MMOL/L — LOW (ref 5–17)
APPEARANCE UR: CLEAR — SIGNIFICANT CHANGE UP
AST SERPL-CCNC: 23 U/L — SIGNIFICANT CHANGE UP (ref 15–37)
BACTERIA # UR AUTO: NEGATIVE /HPF — SIGNIFICANT CHANGE UP
BASOPHILS # BLD AUTO: 0.08 K/UL — SIGNIFICANT CHANGE UP (ref 0–0.2)
BASOPHILS NFR BLD AUTO: 1 % — SIGNIFICANT CHANGE UP (ref 0–2)
BILIRUB SERPL-MCNC: 0.2 MG/DL — SIGNIFICANT CHANGE UP (ref 0.2–1.2)
BILIRUB UR-MCNC: NEGATIVE — SIGNIFICANT CHANGE UP
BUN SERPL-MCNC: 23 MG/DL — SIGNIFICANT CHANGE UP (ref 7–23)
CALCIUM SERPL-MCNC: 9.2 MG/DL — SIGNIFICANT CHANGE UP (ref 8.5–10.1)
CAST: 0 /LPF — SIGNIFICANT CHANGE UP (ref 0–4)
CHLORIDE SERPL-SCNC: 111 MMOL/L — HIGH (ref 96–108)
CO2 SERPL-SCNC: 24 MMOL/L — SIGNIFICANT CHANGE UP (ref 22–31)
COLOR SPEC: YELLOW — SIGNIFICANT CHANGE UP
CREAT SERPL-MCNC: 0.79 MG/DL — SIGNIFICANT CHANGE UP (ref 0.5–1.3)
DIFF PNL FLD: ABNORMAL
EGFR: 103 ML/MIN/1.73M2 — SIGNIFICANT CHANGE UP
EOSINOPHIL # BLD AUTO: 0.23 K/UL — SIGNIFICANT CHANGE UP (ref 0–0.5)
EOSINOPHIL NFR BLD AUTO: 3 % — SIGNIFICANT CHANGE UP (ref 0–6)
GLUCOSE SERPL-MCNC: 94 MG/DL — SIGNIFICANT CHANGE UP (ref 70–99)
GLUCOSE UR QL: NEGATIVE MG/DL — SIGNIFICANT CHANGE UP
HCT VFR BLD CALC: 35.9 % — SIGNIFICANT CHANGE UP (ref 34.5–45)
HGB BLD-MCNC: 11.6 G/DL — SIGNIFICANT CHANGE UP (ref 11.5–15.5)
KETONES UR-MCNC: NEGATIVE MG/DL — SIGNIFICANT CHANGE UP
LEUKOCYTE ESTERASE UR-ACNC: ABNORMAL
LYMPHOCYTES # BLD AUTO: 1.8 K/UL — SIGNIFICANT CHANGE UP (ref 1–3.3)
LYMPHOCYTES # BLD AUTO: 23 % — SIGNIFICANT CHANGE UP (ref 13–44)
MANUAL SMEAR VERIFICATION: SIGNIFICANT CHANGE UP
MCHC RBC-ENTMCNC: 30.3 PG — SIGNIFICANT CHANGE UP (ref 27–34)
MCHC RBC-ENTMCNC: 32.3 GM/DL — SIGNIFICANT CHANGE UP (ref 32–36)
MCV RBC AUTO: 93.7 FL — SIGNIFICANT CHANGE UP (ref 80–100)
MONOCYTES # BLD AUTO: 0.94 K/UL — HIGH (ref 0–0.9)
MONOCYTES NFR BLD AUTO: 12 % — SIGNIFICANT CHANGE UP (ref 2–14)
NEUTROPHILS # BLD AUTO: 4.69 K/UL — SIGNIFICANT CHANGE UP (ref 1.8–7.4)
NEUTROPHILS NFR BLD AUTO: 60 % — SIGNIFICANT CHANGE UP (ref 43–77)
NITRITE UR-MCNC: NEGATIVE — SIGNIFICANT CHANGE UP
NRBC # BLD: 0 /100 WBCS — SIGNIFICANT CHANGE UP (ref 0–0)
NRBC # BLD: SIGNIFICANT CHANGE UP /100 WBCS (ref 0–0)
PH UR: 6 — SIGNIFICANT CHANGE UP (ref 5–8)
PLAT MORPH BLD: NORMAL — SIGNIFICANT CHANGE UP
PLATELET # BLD AUTO: 358 K/UL — SIGNIFICANT CHANGE UP (ref 150–400)
PLATELET COUNT - ESTIMATE: NORMAL — SIGNIFICANT CHANGE UP
POTASSIUM SERPL-MCNC: 4.2 MMOL/L — SIGNIFICANT CHANGE UP (ref 3.5–5.3)
POTASSIUM SERPL-SCNC: 4.2 MMOL/L — SIGNIFICANT CHANGE UP (ref 3.5–5.3)
PROT SERPL-MCNC: 7.8 GM/DL — SIGNIFICANT CHANGE UP (ref 6–8.3)
PROT UR-MCNC: NEGATIVE MG/DL — SIGNIFICANT CHANGE UP
RBC # BLD: 3.83 M/UL — SIGNIFICANT CHANGE UP (ref 3.8–5.2)
RBC # FLD: 13.1 % — SIGNIFICANT CHANGE UP (ref 10.3–14.5)
RBC BLD AUTO: NORMAL — SIGNIFICANT CHANGE UP
RBC CASTS # UR COMP ASSIST: 1 /HPF — SIGNIFICANT CHANGE UP (ref 0–4)
SODIUM SERPL-SCNC: 138 MMOL/L — SIGNIFICANT CHANGE UP (ref 135–145)
SP GR SPEC: 1.01 — SIGNIFICANT CHANGE UP (ref 1–1.03)
SQUAMOUS # UR AUTO: 0 /HPF — SIGNIFICANT CHANGE UP (ref 0–5)
TROPONIN I, HIGH SENSITIVITY RESULT: 3.02 NG/L — SIGNIFICANT CHANGE UP
UROBILINOGEN FLD QL: 0.2 MG/DL — SIGNIFICANT CHANGE UP (ref 0.2–1)
VARIANT LYMPHS # BLD: 1 % — SIGNIFICANT CHANGE UP (ref 0–6)
WBC # BLD: 7.81 K/UL — SIGNIFICANT CHANGE UP (ref 3.8–10.5)
WBC # FLD AUTO: 7.81 K/UL — SIGNIFICANT CHANGE UP (ref 3.8–10.5)
WBC UR QL: 5 /HPF — SIGNIFICANT CHANGE UP (ref 0–5)

## 2024-04-23 PROCEDURE — 93970 EXTREMITY STUDY: CPT | Mod: 26

## 2024-04-23 PROCEDURE — 71045 X-RAY EXAM CHEST 1 VIEW: CPT | Mod: 26

## 2024-04-23 RX ORDER — IBUPROFEN 200 MG
600 TABLET ORAL ONCE
Refills: 0 | Status: DISCONTINUED | OUTPATIENT
Start: 2024-04-23 | End: 2024-04-23

## 2024-04-23 NOTE — ED PROVIDER NOTE - NSFOLLOWUPINSTRUCTIONS_ED_ALL_ED_FT
** You were not found to have a clot.    ** Take over the counter Tylenol or Ibuprofen for pain -- follow dosing directions on original bottle.    ** Follow up with your primary care doctor and OB in the next 72 hours.     ** Go to the nearest Emergency Department if you experience any new or concerning symptoms, such as:   - worsening pain  - chest pain  - difficulty breathing  - passing out  - unable to eat or drink  - unable to move or feel part of your body  - fever, chills

## 2024-04-23 NOTE — ED ADULT NURSE NOTE - NSFALLUNIVINTERV_ED_ALL_ED
Bed/Stretcher in lowest position, wheels locked, appropriate side rails in place/Call bell, personal items and telephone in reach/Instruct patient to call for assistance before getting out of bed/chair/stretcher/Non-slip footwear applied when patient is off stretcher/[place] to call system/Physically safe environment - no spills, clutter or unnecessary equipment/Purposeful proactive rounding/Room/bathroom lighting operational, light cord in reach

## 2024-04-23 NOTE — ED PROVIDER NOTE - OBJECTIVE STATEMENT
30 yo F  3.5 weeks post partum, pw chest pain and right inguinal pain shooting down to foot x 2 days. pt sent in by OB to r.o dvt,. Of note pt received blood transfusion 5 days post partum for anemia. CP is midline retrosternal chest, pressure and burning, worse when supine, with mild sob when supine. PT endorse mild fatigue, like scanlon she was anemic. Pt denies vaginal bleeding at this time, is breast feeding .     multiple medical complaints

## 2024-04-23 NOTE — ED PROVIDER NOTE - PHYSICAL EXAMINATION
Bernadette Rossi MD Attending  GEN: Patient awake alert NAD.   HEENT: normocephalic, atraumatic, EOMI, no scleral icterus, moist MM  CARDIAC: RRR, S1, S2, no murmur.   PULM: CTA B/L no wheeze, rhonchi, rales.   ABD: soft NT, ND, no rebound no guarding  MSK: Moving all extremities, no edema. 5/5 strength and full ROM in all extremities, mild right inguinal pain, no significant R leg swelling     NEURO: A&Ox3, gait normal, no focal neurological deficits, CN 2-12 grossly intact  SKIN: warm, dry, no rash.

## 2024-04-23 NOTE — ED PROVIDER NOTE - CLINICAL SUMMARY MEDICAL DECISION MAKING FREE TEXT BOX
Bernadette Rossi MD, Attending  ddx includes, but is not limited to the following: DVT, anemia, myocarditis, pericarditis, pericardial effusion, lower suspicion for PE.   plan: chest pain screening labs, EKG, CXR, DVT study, reassess for need for further testing such as CTA to Ro PE  update: see progress notes.   ----

## 2024-04-23 NOTE — ED ADULT NURSE NOTE - CHIEF COMPLAINT QUOTE
Pt presents to ED c/o chest pain when lying down and right thigh shooting pain x 2 days. Pt is 3.5 week post partum and was told to come in by her MD to r/o blood clots. Pt also reports she had to have a blood transfusion 5 days post partum. no other pmh.

## 2024-04-23 NOTE — ED PROVIDER NOTE - PATIENT PORTAL LINK FT
You can access the FollowMyHealth Patient Portal offered by [facility] by registering at the following website: [email or web address]. By joining [employer]’s FollowMyHealth portal, you will also be able to view your health information using other applications (apps) compatible with our system.

## 2024-05-01 NOTE — OB RN TRIAGE NOTE - NS_PARA_OBGYN_ALL_OB_NU
[TextEntry] : patient describing musculoskeletal pain of the neck upon moving 2 x over few weeks recommend warm compress follow up ortho no signs meningitis  0

## 2024-05-02 RX ORDER — BUDESONIDE AND FORMOTEROL FUMARATE DIHYDRATE 160; 4.5 UG/1; UG/1
160-4.5 AEROSOL RESPIRATORY (INHALATION) TWICE DAILY
Qty: 3 | Refills: 1 | Status: ACTIVE | COMMUNITY
Start: 2023-09-03 | End: 1900-01-01

## 2024-05-06 ENCOUNTER — APPOINTMENT (OUTPATIENT)
Age: 29
End: 2024-05-06

## 2024-05-06 PROCEDURE — ZZZZZ: CPT

## 2024-05-07 ENCOUNTER — APPOINTMENT (OUTPATIENT)
Age: 29
End: 2024-05-07
Payer: COMMERCIAL

## 2024-05-07 PROCEDURE — S9443: CPT | Mod: 95

## 2024-05-08 ENCOUNTER — NON-APPOINTMENT (OUTPATIENT)
Age: 29
End: 2024-05-08

## 2024-05-08 ENCOUNTER — APPOINTMENT (OUTPATIENT)
Dept: PULMONOLOGY | Facility: CLINIC | Age: 29
End: 2024-05-08
Payer: COMMERCIAL

## 2024-05-08 ENCOUNTER — APPOINTMENT (OUTPATIENT)
Dept: PULMONOLOGY | Facility: CLINIC | Age: 29
End: 2024-05-08

## 2024-05-08 ENCOUNTER — APPOINTMENT (OUTPATIENT)
Dept: OBGYN | Facility: CLINIC | Age: 29
End: 2024-05-08
Payer: COMMERCIAL

## 2024-05-08 VITALS
HEIGHT: 64 IN | HEART RATE: 71 BPM | WEIGHT: 138 LBS | DIASTOLIC BLOOD PRESSURE: 87 MMHG | BODY MASS INDEX: 23.56 KG/M2 | SYSTOLIC BLOOD PRESSURE: 132 MMHG

## 2024-05-08 VITALS
WEIGHT: 138 LBS | TEMPERATURE: 97.7 F | SYSTOLIC BLOOD PRESSURE: 110 MMHG | HEIGHT: 64 IN | OXYGEN SATURATION: 98 % | HEART RATE: 81 BPM | DIASTOLIC BLOOD PRESSURE: 64 MMHG | RESPIRATION RATE: 16 BRPM | BODY MASS INDEX: 23.56 KG/M2

## 2024-05-08 DIAGNOSIS — R06.02 SHORTNESS OF BREATH: ICD-10-CM

## 2024-05-08 DIAGNOSIS — Z72.820 SLEEP DEPRIVATION: ICD-10-CM

## 2024-05-08 DIAGNOSIS — J45.909 UNSPECIFIED ASTHMA, UNCOMPLICATED: ICD-10-CM

## 2024-05-08 DIAGNOSIS — J30.2 OTHER SEASONAL ALLERGIC RHINITIS: ICD-10-CM

## 2024-05-08 PROCEDURE — 99214 OFFICE O/P EST MOD 30 MIN: CPT | Mod: 25

## 2024-05-08 PROCEDURE — 95012 NITRIC OXIDE EXP GAS DETER: CPT

## 2024-05-08 PROCEDURE — 0503F POSTPARTUM CARE VISIT: CPT

## 2024-05-08 PROCEDURE — 94727 GAS DIL/WSHOT DETER LNG VOL: CPT

## 2024-05-08 PROCEDURE — ZZZZZ: CPT

## 2024-05-08 PROCEDURE — 94010 BREATHING CAPACITY TEST: CPT

## 2024-05-08 PROCEDURE — 94729 DIFFUSING CAPACITY: CPT

## 2024-05-08 NOTE — ASSESSMENT
[FreeTextEntry1] : Ms. AGUIRRE  is a 29 year old female with a history of UTI in the past, UPJ surgery, Rh incompatibility, Ovarian cyst, Asthma, Allergies who now comes to the office for a follow up pulmonary evaluation for SOB, Mild Persistent Asthma, Allergies/Sinus, GERD - Stable Post Partum    Her shortness of breath is multifactorial due to: -poor mechanics of breathing -pulmonary disease   -mild persistent asthma   problem 1: Mild Persistent Asthma - quiet -continue Symbicort 160 2 inhalations BID -continue Albuterol for rescue, Q6H -add Spiriva at 2 inhalations QAM -Inhaler technique reviewed as well as oral hygiene techniques reviewed with patient. Avoidance of cold air, extremes of temperature, rescue inhaler should be used before exercise. Order of medication reviewed with patient. Recommended use of a cool mist humidifier in the bedroom. - Asthma is believed to be caused by inherited (genetic) and environmental factor, but its exact cause is unknown.  - Asthma may be triggered by allergens, lung infections, or irritants in the air. Asthma triggers are different for each person   problem 2: Allergies / Sinus -get Blood work to include: asthma panel, food IgE panel, IgE level, eosinophil level, vitamin D level (NC) -continue Allegra 180 mg QAM -add Rhinocort 1 sniff BID Environmental measures for allergies were encouraged including mattress and pillow covers, air purifier, and environmental controls.   Problem 3: GERD -recommended Reflux Gourmet -Rule of 2s: avoid eating too much, eating too late, eating too spicy, eating two hours before bed. -Things to avoid including overeating, spicy foods, tight clothing, eating within three hours of bed, this list is not all inclusive. -For treatment of reflux, possible options discussed including diet control, H2 blockers, PPIs, as well as coating motility agents discussed as treatment options. Timing of meals and proximity of last meal to sleep were discussed. If symptoms persist, a formal gastrointestinal evaluation is needed.   Problem 4: Pregnancy related poor sleep -recommended Epsom Salt bath  -recommended Neuro Mag    Problem 5: ?cardiac disease (doubtful)  -recommended to continue to follow up with Cardiologist  - no evidence of PAH   problem 6: poor breathing mechanics -Proper breathing techniques were reviewed with an emphasis of exhalation. Patient instructed to breath in for 1 second and out for four seconds. Patient was encouraged to not talk while walking.   problem 7: health maintenance -recommended yearly flu shot after October 15 2023 -recommended strep pneumonia vaccines: Prevnar-20 vaccine, followed by Pneumo vaccine 23 one year following after 65 years old. -recommended early intervention for Upper Respiratory Infections (URIs) -recommended regular osteoporosis evaluations -recommended early dermatological evaluations -recommended after the age of 50 to the age of 70, colonoscopy every 5 years   F/U in 4-6 months She is encouraged to call with any changes, concerns, or questions

## 2024-05-08 NOTE — REASON FOR VISIT
[Follow-Up] : a follow-up visit [TextBox_44] : SOB, Mild Persistent Asthma, Allergies/Sinus, GERD, pregnancy related poor sleep

## 2024-05-08 NOTE — HISTORY OF PRESENT ILLNESS
[TextBox_4] : Ms. AGUIRRE  is a 29 year old female with a history of presenting to the office today for a follow up pulmonary evaluation. Her chief complaint is  - she notes she is 6 weeks post partum - she notes she had to go back to the hospital for a blood transfusion after having a lot of bleeding during birth - she notes currently feeling much better - she notes currently taking iron - she notes she is breast feeding  - she notes her reflux is gone - she notes her baby has been sleeping well - she notes walking and stretching for exercise  - she notes bowels are regular - sense of taste and smell are good - she notes her allergies are active which has brought on some congestion  - she notes taking Allertec in the mornings  - she notes occasional SOB with the change in weather and being anemic after giving birth  - she notes wanting to get back to running and Pilates   -she denies any headaches, nausea, emesis, fever, chills, sweats, chest pain, chest pressure, coughing, wheezing, palpitations, constipation, diarrhea, vertigo, dysphagia, heartburn, reflux, itchy eyes, itchy ears, leg swelling, leg pain, arthralgias, myalgias, hoarseness, or sour taste in the mouth.

## 2024-05-08 NOTE — END OF VISIT
[FreeTextEntry3] :  I, Cielo Teague, acted as a scribe on behalf of Dr. Aleksandra Gregorio on 05/08/2024.   All medical entries made by the scribe were at my, Dr. Aleksandra Gregorio's, direction and personally dictated by me on 05/08/2024. I have reviewed the chart and agree that the record accurately reflects my personal performance of the history, physical exam, assessment, and plan. I have also personally directed, reviewed, and agreed with the chart.

## 2024-05-08 NOTE — PROCEDURE
[FreeTextEntry1] : Full PFT reveals normal flows; FEV1 was 2.99 L which is 99% of predicted; normal lung volumes; normal diffusion at 17.4, which is 76% of predicted; normal flow volume loop. PFTs were performed to evaluate for SOB  FENO was 9; a normal value being less than 25 Fractional exhaled nitric oxide (FENO) is regarded as a simple, noninvasive method for assessing eosinophilic airway inflammation. Produced by a variety of cells within the lung, nitric oxide (NO) concentrations are generally low in healthy individuals. However, high concentrations of NO appear to be involved in nonspecific host defense mechanisms and chronic inflammatory diseases such as asthma. The American Thoracic Society (ATS) therefore has strongly recommended using FENO to aid in the assessment, management, and long-term monitoring of eosinophilic airway inflammation and asthma, and for identifying steroid responsive individuals whose chronic respiratory symptoms may be caused by airway inflammation. In their 2011 clinical practice guideline, the ATS emphasizes the importance of using FENO.

## 2024-05-08 NOTE — HISTORY OF PRESENT ILLNESS
[FreeTextEntry1] : 30 yo presents for a post-partum visit Delivery details: s/p  on 3/27/24, female, 7.01 oz, delivered by Dr. Winkler Delivery complications: post-partum hemorrhage s/p Karla and return to ER for blood transfusion 1 unit  Current symptoms and complaints: normal bladder function, normal bowel function, no PPD sx.  Pt is currently taking iron/vitamin C and will cut down to a few times a week as opposed to daily.  no mastitis sx no lochia. no acute complaints.  Infant feeding: -breast -pumping and breast-feeding  Physical Exam:  Breast: -Normal and within normal limits Abdomen/pelvic -No abdominal pain or tenderness -perineum; well healed, BME wnl, no CMT -sutures from labia removed today  L/E: Warm and well perfused  Assessment: -s/p  c/b PPH and requiring 1u pRBC, doing well  Plan: -Inter pregnancy interval discussed -contraception options discussed - pt to use condoms -mastitis precautions reviewed -post partum clearance -RTO 3-4 months for annual or prn

## 2024-06-01 ENCOUNTER — RX RENEWAL (OUTPATIENT)
Age: 29
End: 2024-06-01

## 2024-06-01 RX ORDER — TIOTROPIUM BROMIDE INHALATION SPRAY 3.12 UG/1
2.5 SPRAY, METERED RESPIRATORY (INHALATION) DAILY
Qty: 3 | Refills: 1 | Status: ACTIVE | COMMUNITY
Start: 2023-09-28 | End: 1900-01-01

## 2024-06-03 ENCOUNTER — APPOINTMENT (OUTPATIENT)
Dept: INTERNAL MEDICINE | Facility: CLINIC | Age: 29
End: 2024-06-03

## 2024-06-21 ENCOUNTER — NON-APPOINTMENT (OUTPATIENT)
Age: 29
End: 2024-06-21

## 2024-06-21 ENCOUNTER — APPOINTMENT (OUTPATIENT)
Dept: DERMATOLOGY | Facility: CLINIC | Age: 29
End: 2024-06-21
Payer: COMMERCIAL

## 2024-06-21 DIAGNOSIS — D22.9 MELANOCYTIC NEVI, UNSPECIFIED: ICD-10-CM

## 2024-06-21 DIAGNOSIS — L70.0 ACNE VULGARIS: ICD-10-CM

## 2024-06-21 DIAGNOSIS — Z12.83 ENCOUNTER FOR SCREENING FOR MALIGNANT NEOPLASM OF SKIN: ICD-10-CM

## 2024-06-21 PROCEDURE — 99204 OFFICE O/P NEW MOD 45 MIN: CPT

## 2024-06-21 RX ORDER — TRETINOIN 0.25 MG/G
0.03 CREAM TOPICAL
Qty: 1 | Refills: 4 | Status: ACTIVE | COMMUNITY
Start: 2024-06-21 | End: 1900-01-01

## 2024-06-21 RX ORDER — CLASCOTERONE 1 G/100G
1 CREAM TOPICAL
Qty: 1 | Refills: 4 | Status: ACTIVE | COMMUNITY
Start: 2024-06-21 | End: 1900-01-01

## 2024-06-21 NOTE — HISTORY OF PRESENT ILLNESS
[FreeTextEntry1] : spots [de-identified] : Ms. JEAN CARLOS AGUIRRE is a 29 year old F postpartum (currently BF) here for evaluation of below   #Acne, flaring since recent pregnancy. Still breastfeeding.  Worst on chin  #FBSE.  Spots scattered on body x years. Asymptomatic and unchanged. No alleviating/aggravating factors. Never been treated.   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

## 2024-06-21 NOTE — PHYSICAL EXAM
[Alert] : alert [Oriented x 3] : ~L oriented x 3 [Well Nourished] : well nourished [Conjunctiva Non-injected] : conjunctiva non-injected [No Visual Lymphadenopathy] : no visual  lymphadenopathy [No Clubbing] : no clubbing [No Edema] : no edema [No Bromhidrosis] : no bromhidrosis [No Chromhidrosis] : no chromhidrosis [Full Body Skin Exam Performed] : performed [FreeTextEntry3] : General: Alert and oriented, in NAD.  All of the following were examined and were within normal limits, except as noted:   Scalp: Face, including eyelids, nose, lips, ears, oropharynx: Neck: Chest/Back/Abdomen: Bilateral Arms/Hands: Bilateral Legs/Feet: Buttocks, Genitalia, Anus/perineum:  	 Hair, Nails, Oral Mucosa, Eyes:   - excoriated acne on cheeks and chin - brown to dark brown homogenous macules on body

## 2024-06-21 NOTE — ASSESSMENT
[FreeTextEntry1] : Acne, +hormonal - new dx of uncertain prognosis - Diagnosis, chronic nature, disease course, treatment options and goals of therapy discussed - Start winlevi cream BID. D/c if pregnant. Ok to use while BF, limited absorption with topical use - Start tretinoin 0.025% cream qhs. [place] technique moisturizer discussed. D/c if pregnant. Ok to use while BF, limited absorption with topical use - RTC 3 months  Multiple melanocytic nevi, benign  - Monitor moles for changes  - Recommend wearing hats and sun protective clothing or OTC sunscreen products (SPF 30+, broad band, EltaMD/La Roche Posay/Neutrogena) daily on your face and entire body (apply sunscreen atleast 30 minutes prior to going outside). Reapply sunscreen every 2 hours when outside. - RTC 6 months given extent of nevi susy on torso  Screening exam for skin cancer - no suspicious lesions on exam today. Will request prior biopsy records - TBSE performed today - Advised sun protection.  Recommended OTC sunscreen products (EltaMD/Neutrogena/La Roche Posay), including SPF30+ with broadband UV protection as well as proper use.  Discussed OTC sun protective clothing - Counseled patient to monitor for changes, including mole monitoring and self-skin exams

## 2024-07-05 ENCOUNTER — APPOINTMENT (OUTPATIENT)
Dept: INTERNAL MEDICINE | Facility: CLINIC | Age: 29
End: 2024-07-05

## 2024-07-11 ENCOUNTER — APPOINTMENT (OUTPATIENT)
Dept: FAMILY MEDICINE | Facility: CLINIC | Age: 29
End: 2024-07-11
Payer: COMMERCIAL

## 2024-07-11 VITALS
HEIGHT: 64 IN | OXYGEN SATURATION: 99 % | WEIGHT: 130 LBS | TEMPERATURE: 97.9 F | DIASTOLIC BLOOD PRESSURE: 64 MMHG | BODY MASS INDEX: 22.2 KG/M2 | SYSTOLIC BLOOD PRESSURE: 124 MMHG | HEART RATE: 64 BPM

## 2024-07-11 DIAGNOSIS — Z00.00 ENCOUNTER FOR GENERAL ADULT MEDICAL EXAMINATION W/OUT ABNORMAL FINDINGS: ICD-10-CM

## 2024-07-11 PROCEDURE — 99385 PREV VISIT NEW AGE 18-39: CPT

## 2024-07-11 PROCEDURE — 36415 COLL VENOUS BLD VENIPUNCTURE: CPT

## 2024-07-12 LAB
ALBUMIN SERPL ELPH-MCNC: 4.6 G/DL
ALP BLD-CCNC: 97 U/L
ALT SERPL-CCNC: 13 U/L
AST SERPL-CCNC: 18 U/L
BASOPHILS # BLD AUTO: 0.06 K/UL
BASOPHILS NFR BLD AUTO: 0.9 %
BILIRUB SERPL-MCNC: 0.3 MG/DL
BUN SERPL-MCNC: 11 MG/DL
CALCIUM SERPL-MCNC: 9.8 MG/DL
CHLORIDE SERPL-SCNC: 108 MMOL/L
CHOLEST SERPL-MCNC: 180 MG/DL
CREAT SERPL-MCNC: 0.91 MG/DL
EGFR: 87 ML/MIN/1.73M2
EOSINOPHIL # BLD AUTO: 0.08 K/UL
EOSINOPHIL NFR BLD AUTO: 1.1 %
ESTIMATED AVERAGE GLUCOSE: 105 MG/DL
FERRITIN SERPL-MCNC: 23 NG/ML
GLUCOSE SERPL-MCNC: 69 MG/DL
HBA1C MFR BLD HPLC: 5.3 %
HCT VFR BLD CALC: 41.9 %
HDLC SERPL-MCNC: 79 MG/DL
HGB BLD-MCNC: 13.1 G/DL
IMM GRANULOCYTES NFR BLD AUTO: 0.4 %
IRON SATN MFR SERPL: 24 %
IRON SERPL-MCNC: 72 UG/DL
LDLC SERPL CALC-MCNC: 92 MG/DL
LYMPHOCYTES # BLD AUTO: 2.81 K/UL
LYMPHOCYTES NFR BLD AUTO: 40.3 %
MAN DIFF?: NORMAL
MCHC RBC-ENTMCNC: 28.4 PG
MCHC RBC-ENTMCNC: 31.3 GM/DL
MCV RBC AUTO: 90.9 FL
MONOCYTES # BLD AUTO: 0.51 K/UL
MONOCYTES NFR BLD AUTO: 7.3 %
NEUTROPHILS # BLD AUTO: 3.48 K/UL
NEUTROPHILS NFR BLD AUTO: 50 %
NONHDLC SERPL-MCNC: 101 MG/DL
PLATELET # BLD AUTO: 302 K/UL
POTASSIUM SERPL-SCNC: 4.4 MMOL/L
PROT SERPL-MCNC: 7 G/DL
RBC # BLD: 4.61 M/UL
RBC # FLD: 13.5 %
SODIUM SERPL-SCNC: 143 MMOL/L
TIBC SERPL-MCNC: 294 UG/DL
TRIGL SERPL-MCNC: 43 MG/DL
TSH SERPL-ACNC: 1.97 UIU/ML
UIBC SERPL-MCNC: 223 UG/DL
WBC # FLD AUTO: 6.97 K/UL

## 2024-07-22 ENCOUNTER — NON-APPOINTMENT (OUTPATIENT)
Age: 29
End: 2024-07-22

## 2024-08-14 ENCOUNTER — APPOINTMENT (OUTPATIENT)
Dept: OBGYN | Facility: CLINIC | Age: 29
End: 2024-08-14
Payer: COMMERCIAL

## 2024-08-14 VITALS
WEIGHT: 129 LBS | DIASTOLIC BLOOD PRESSURE: 82 MMHG | HEIGHT: 64 IN | BODY MASS INDEX: 22.02 KG/M2 | SYSTOLIC BLOOD PRESSURE: 119 MMHG

## 2024-08-14 DIAGNOSIS — Z01.419 ENCOUNTER FOR GYNECOLOGICAL EXAMINATION (GENERAL) (ROUTINE) W/OUT ABNORMAL FINDINGS: ICD-10-CM

## 2024-08-14 DIAGNOSIS — Z87.59 PERSONAL HISTORY OF OTHER COMPLICATIONS OF PREGNANCY, CHILDBIRTH AND THE PUERPERIUM: ICD-10-CM

## 2024-08-14 PROCEDURE — 99395 PREV VISIT EST AGE 18-39: CPT

## 2024-08-14 NOTE — HISTORY OF PRESENT ILLNESS
[FreeTextEntry1] : 29 year old  female presenting for annual exam. Last seen for PPV in May, s/p  complicated by PP hemorrhage requiring ED visit for blood transfusion. Pt is doing well and breast feeding, menses have not returned - Pt is returning to work next week. Using condoms for contraception.

## 2024-08-14 NOTE — END OF VISIT
[FreeTextEntry3] : I, Palmira Villa, acted as a scribe on behalf of Dr. Ayanna Winkler M.D. on 08/14/2024.  All medical entries made by the scribe were at my, Dr. Ayanna Winkler M.D., direction and personally dictated by me on 08/14/2024. I have reviewed the chart and agree that the record accurately reflects my personal performance of the history, physical exam, assessment and plan. I have also personally directed, reviewed, and agreed with the chart.

## 2024-08-14 NOTE — PLAN
[FreeTextEntry1] : 29 year old female presents for an annual  -PAP/HPV -Continue condoms -Pt counseled amenorrhea is normal while breastfeeding  RTO in 1 year

## 2024-08-15 LAB — HPV HIGH+LOW RISK DNA PNL CVX: NOT DETECTED

## 2024-08-22 ENCOUNTER — TRANSCRIPTION ENCOUNTER (OUTPATIENT)
Age: 29
End: 2024-08-22

## 2024-08-22 LAB — CYTOLOGY CVX/VAG DOC THIN PREP: NORMAL

## 2024-08-28 NOTE — OB PROVIDER TRIAGE NOTE - NSPREVIOUSTERM_OBGYN_ALL_OB
You received a new medication called panitumumab today. Please start the minocylcin tablet tonight and then take it twice daily while on the panitumumab.    No

## 2024-09-10 ENCOUNTER — APPOINTMENT (OUTPATIENT)
Dept: DERMATOLOGY | Facility: CLINIC | Age: 29
End: 2024-09-10
Payer: COMMERCIAL

## 2024-09-10 DIAGNOSIS — L70.0 ACNE VULGARIS: ICD-10-CM

## 2024-09-10 PROCEDURE — 99214 OFFICE O/P EST MOD 30 MIN: CPT

## 2024-09-10 RX ORDER — TRETINOIN 0.5 MG/G
0.05 CREAM TOPICAL
Qty: 1 | Refills: 4 | Status: ACTIVE | COMMUNITY
Start: 2024-09-10 | End: 1900-01-01

## 2024-09-10 NOTE — ASSESSMENT
[FreeTextEntry1] : #Acne, +hormonal - chronic; improving though not at tx goal. Still flaring  Failing tret 0.025%  - Diagnosis, chronic nature, disease course, treatment options and goals of therapy discussed - c/w winlevi cream BID. D/c if pregnant.  Ok to use while BF, limited absorption with topical use - Start tretinoin 0.05% cream qhs. Raysal technique moisturizer discussed. D/c if pregnant.  Ok to use while BF, limited absorption with topical use   RTC Deember for acne FU and q6mo skin check given extent of nevi susy on torso

## 2024-09-10 NOTE — HISTORY OF PRESENT ILLNESS
[FreeTextEntry1] : acne [de-identified] : Ms. JEAN CARLOS AGUIRRE is a 29 year old F postpartum (currently BF) here for evaluation of below LV 6/21/24 for FBSE   #Acne, flaring since recent pregnancy. Still breastfeeding.  Worst on chin - 9/2024: since LV in june, using winlevi cream BID and tretinoin 0.025% with improvement overall. However broke out last week on chin   Derm Hx: benign spots excised, per pt Personal hx of skin cancer: no FHx of skin cancer: mom with precancer Social Hx:  for [facility]

## 2024-09-10 NOTE — PHYSICAL EXAM
[Alert] : alert [Oriented x 3] : ~L oriented x 3 [Well Nourished] : well nourished [Conjunctiva Non-injected] : conjunctiva non-injected [No Visual Lymphadenopathy] : no visual  lymphadenopathy [No Clubbing] : no clubbing [No Edema] : no edema [No Bromhidrosis] : no bromhidrosis [No Chromhidrosis] : no chromhidrosis [Declined] : declined [FreeTextEntry3] :  - few excoriated acne on chin; cheeks clear

## 2024-10-17 ENCOUNTER — NON-APPOINTMENT (OUTPATIENT)
Age: 29
End: 2024-10-17

## 2024-11-02 ENCOUNTER — RX RENEWAL (OUTPATIENT)
Age: 29
End: 2024-11-02

## 2024-11-06 ENCOUNTER — APPOINTMENT (OUTPATIENT)
Dept: PULMONOLOGY | Facility: CLINIC | Age: 29
End: 2024-11-06
Payer: COMMERCIAL

## 2024-11-06 VITALS
DIASTOLIC BLOOD PRESSURE: 70 MMHG | BODY MASS INDEX: 21 KG/M2 | RESPIRATION RATE: 18 BRPM | SYSTOLIC BLOOD PRESSURE: 114 MMHG | TEMPERATURE: 97.6 F | WEIGHT: 123 LBS | OXYGEN SATURATION: 98 % | HEIGHT: 64 IN | HEART RATE: 72 BPM

## 2024-11-06 DIAGNOSIS — Z72.820 SLEEP DEPRIVATION: ICD-10-CM

## 2024-11-06 DIAGNOSIS — J45.909 UNSPECIFIED ASTHMA, UNCOMPLICATED: ICD-10-CM

## 2024-11-06 DIAGNOSIS — J30.2 OTHER SEASONAL ALLERGIC RHINITIS: ICD-10-CM

## 2024-11-06 DIAGNOSIS — R06.02 SHORTNESS OF BREATH: ICD-10-CM

## 2024-11-06 PROCEDURE — 99214 OFFICE O/P EST MOD 30 MIN: CPT | Mod: 25

## 2024-11-06 PROCEDURE — 95012 NITRIC OXIDE EXP GAS DETER: CPT

## 2024-11-06 PROCEDURE — 94010 BREATHING CAPACITY TEST: CPT

## 2024-11-09 ENCOUNTER — RX RENEWAL (OUTPATIENT)
Age: 29
End: 2024-11-09

## 2024-12-04 DIAGNOSIS — N61.0 MASTITIS WITHOUT ABSCESS: ICD-10-CM

## 2024-12-04 RX ORDER — DICLOXACILLIN SODIUM 500 MG/1
500 CAPSULE ORAL 4 TIMES DAILY
Qty: 40 | Refills: 0 | Status: ACTIVE | COMMUNITY
Start: 2024-12-04 | End: 1900-01-01

## 2024-12-19 ENCOUNTER — APPOINTMENT (OUTPATIENT)
Dept: DERMATOLOGY | Facility: CLINIC | Age: 29
End: 2024-12-19
Payer: COMMERCIAL

## 2024-12-19 DIAGNOSIS — L70.0 ACNE VULGARIS: ICD-10-CM

## 2024-12-19 DIAGNOSIS — L21.9 SEBORRHEIC DERMATITIS, UNSPECIFIED: ICD-10-CM

## 2024-12-19 DIAGNOSIS — D48.7 NEOPLASM OF UNCERTAIN BEHAVIOR OF OTHER SPECIFIED SITES: ICD-10-CM

## 2024-12-19 DIAGNOSIS — D48.5 NEOPLASM OF UNCERTAIN BEHAVIOR OF SKIN: ICD-10-CM

## 2024-12-19 DIAGNOSIS — Z12.83 ENCOUNTER FOR SCREENING FOR MALIGNANT NEOPLASM OF SKIN: ICD-10-CM

## 2024-12-19 DIAGNOSIS — D48.9 NEOPLASM OF UNCERTAIN BEHAVIOR, UNSPECIFIED: ICD-10-CM

## 2024-12-19 DIAGNOSIS — D22.9 MELANOCYTIC NEVI, UNSPECIFIED: ICD-10-CM

## 2024-12-19 PROCEDURE — 11102 TANGNTL BX SKIN SINGLE LES: CPT

## 2024-12-19 PROCEDURE — 11103 TANGNTL BX SKIN EA SEP/ADDL: CPT | Mod: 59

## 2024-12-19 PROCEDURE — 99214 OFFICE O/P EST MOD 30 MIN: CPT | Mod: 25

## 2024-12-19 RX ORDER — KETOCONAZOLE 20 MG/ML
2 SUSPENSION TOPICAL
Qty: 1 | Refills: 8 | Status: ACTIVE | COMMUNITY
Start: 2024-12-19 | End: 1900-01-01

## 2025-01-13 ENCOUNTER — NON-APPOINTMENT (OUTPATIENT)
Age: 30
End: 2025-01-13

## 2025-02-24 NOTE — ED ADULT NURSE NOTE - TEMPLATE LIST FOR HEAD TO TOE ASSESSMENT
The patient is seen for evaluation of her left knee.  She injured her knee about 10 months ago.  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  While climbing up a pole she developed sudden onset of medial sided left knee pain.  Her symptoms have persisted over the last 10 months.  Symptoms do tend to wax and wane a bit but are always somewhat present.  She notes getting up from a chair or climbing stairs is particularly uncomfortable.  Walking or running on uneven ground is also difficult and painful.  She is typically fairly comfortable with walking on level surfaces.  She has been treated with over-the-counter anti-inflammatories, bracing, physical therapy, and steroid injection.  Unfortunately her symptoms persist.  Pain is always located medially.  History with her knee is notable for a previous arthroscopy about 4 years ago.  The surgery was done elsewhere and I do not have any records but she indicates that she had a small tear of her medial meniscus that was trimmed.  Before the recent injury she was feeling fine without any pain or limitations.  She has been able to continue with her regular work activities but feels a bit limited with some of the more physical activity.  She has curtailed some of her workout activities and hiking activities that she enjoys because of her knee pain.  Her health is otherwise satisfactory.    Exam: The patient is healthy and fit in appearance.  She is very pleasant.  She walks with a normal gait pattern without a obvious limp.  There is focal medial joint line tenderness.  There is not tenderness over the course of the medial collateral ligament, however.  Bladimir's test does not really change her symptoms very much.  Hip range of motion is not limited or painful.  There is no knee joint effusion.  The patella tracks well.  There are well-healed arthroscopy scars noted.  There is normal stability with varus and valgus stress.  Anterior drawer,  posterior drawer, and Lachman's tests are normal.    Imaging studies: Plain x-rays of the left knee appear normal.  MRI performed August 26, 2024 demonstrates no obvious abnormalities.  Specifically I do not appreciate meniscal tearing, ligament injury, effusion, or chondral injury.    Assessment and plan: Work-related left knee pain which developed acutely after an injury 10 months ago.  Fortunately her imaging studies do not show any obvious abnormalities but unfortunately she is still struggling with pain despite appropriate conservative treatment measures.  It has been 6 months since her MRI and her symptoms are continuing.  I do not see anything on her MRI for which I would recommend surgical intervention.  I have recommended we repeat the MRI to ensure there has been no change given her persistent symptoms.  She would like to proceed with that.  I have encouraged her to continue with home exercises for strengthening.  The MRI will be obtained and I will see her after it is completed to review the results.  She may continue with unrestricted work activities in the interim.   General

## 2025-04-18 DIAGNOSIS — Z01.84 ENCOUNTER FOR ANTIBODY RESPONSE EXAMINATION: ICD-10-CM

## 2025-05-14 ENCOUNTER — APPOINTMENT (OUTPATIENT)
Dept: PULMONOLOGY | Facility: CLINIC | Age: 30
End: 2025-05-14

## 2025-06-18 ENCOUNTER — NON-APPOINTMENT (OUTPATIENT)
Age: 30
End: 2025-06-18

## 2025-06-24 ENCOUNTER — APPOINTMENT (OUTPATIENT)
Dept: PULMONOLOGY | Facility: CLINIC | Age: 30
End: 2025-06-24

## 2025-07-14 ENCOUNTER — APPOINTMENT (OUTPATIENT)
Dept: FAMILY MEDICINE | Facility: CLINIC | Age: 30
End: 2025-07-14
Payer: COMMERCIAL

## 2025-07-14 VITALS
SYSTOLIC BLOOD PRESSURE: 106 MMHG | WEIGHT: 125 LBS | DIASTOLIC BLOOD PRESSURE: 72 MMHG | BODY MASS INDEX: 21.34 KG/M2 | HEIGHT: 64 IN | OXYGEN SATURATION: 99 % | HEART RATE: 66 BPM

## 2025-07-14 PROCEDURE — 99395 PREV VISIT EST AGE 18-39: CPT

## 2025-07-14 PROCEDURE — 36415 COLL VENOUS BLD VENIPUNCTURE: CPT

## 2025-07-15 LAB
25(OH)D3 SERPL-MCNC: 34.2 NG/ML
ALBUMIN SERPL ELPH-MCNC: 4.8 G/DL
ALP BLD-CCNC: 71 U/L
ALT SERPL-CCNC: 14 U/L
ANION GAP SERPL CALC-SCNC: 14 MMOL/L
AST SERPL-CCNC: 17 U/L
BASOPHILS # BLD AUTO: 0.07 K/UL
BASOPHILS NFR BLD AUTO: 1 %
BILIRUB SERPL-MCNC: 0.7 MG/DL
BUN SERPL-MCNC: 10 MG/DL
CALCIUM SERPL-MCNC: 9.8 MG/DL
CHLORIDE SERPL-SCNC: 103 MMOL/L
CHOLEST SERPL-MCNC: 205 MG/DL
CO2 SERPL-SCNC: 22 MMOL/L
CREAT SERPL-MCNC: 0.85 MG/DL
EGFRCR SERPLBLD CKD-EPI 2021: 94 ML/MIN/1.73M2
EOSINOPHIL # BLD AUTO: 0.07 K/UL
EOSINOPHIL NFR BLD AUTO: 1 %
ESTIMATED AVERAGE GLUCOSE: 100 MG/DL
FERRITIN SERPL-MCNC: 92 NG/ML
FOLATE SERPL-MCNC: >20 NG/ML
GLUCOSE SERPL-MCNC: 86 MG/DL
HBA1C MFR BLD HPLC: 5.1 %
HCT VFR BLD CALC: 43 %
HDLC SERPL-MCNC: 70 MG/DL
HGB BLD-MCNC: 13.2 G/DL
IMM GRANULOCYTES NFR BLD AUTO: 0.3 %
IRON SATN MFR SERPL: 40 %
IRON SERPL-MCNC: 108 UG/DL
LDLC SERPL-MCNC: 125 MG/DL
LYMPHOCYTES # BLD AUTO: 2.55 K/UL
LYMPHOCYTES NFR BLD AUTO: 38.2 %
MAN DIFF?: NORMAL
MCHC RBC-ENTMCNC: 29.9 PG
MCHC RBC-ENTMCNC: 30.7 G/DL
MCV RBC AUTO: 97.3 FL
MONOCYTES # BLD AUTO: 0.52 K/UL
MONOCYTES NFR BLD AUTO: 7.8 %
NEUTROPHILS # BLD AUTO: 3.44 K/UL
NEUTROPHILS NFR BLD AUTO: 51.7 %
NONHDLC SERPL-MCNC: 135 MG/DL
PLATELET # BLD AUTO: 263 K/UL
POTASSIUM SERPL-SCNC: 4.3 MMOL/L
PROT SERPL-MCNC: 7.5 G/DL
RBC # BLD: 4.42 M/UL
RBC # FLD: 13.3 %
SODIUM SERPL-SCNC: 138 MMOL/L
TIBC SERPL-MCNC: 271 UG/DL
TRIGL SERPL-MCNC: 55 MG/DL
TSH SERPL-ACNC: 1.89 UIU/ML
UIBC SERPL-MCNC: 163 UG/DL
VIT B12 SERPL-MCNC: 791 PG/ML
WBC # FLD AUTO: 6.67 K/UL

## 2025-07-17 LAB
MEV IGG FLD QL IA: 37.9 AU/ML
MEV IGG+IGM SER-IMP: POSITIVE
MUV AB SER-ACNC: NEGATIVE
MUV IGG SER QL IA: 8.16 AU/ML
RUBV IGG FLD-ACNC: 5.88 INDEX
RUBV IGG SER-IMP: POSITIVE
VZV AB TITR SER: POSITIVE
VZV IGG SER IF-ACNC: 11 S/CO

## 2025-08-15 ENCOUNTER — APPOINTMENT (OUTPATIENT)
Dept: FAMILY MEDICINE | Facility: CLINIC | Age: 30
End: 2025-08-15
Payer: COMMERCIAL

## 2025-08-15 VITALS
OXYGEN SATURATION: 99 % | HEIGHT: 64 IN | SYSTOLIC BLOOD PRESSURE: 100 MMHG | HEART RATE: 96 BPM | DIASTOLIC BLOOD PRESSURE: 66 MMHG | BODY MASS INDEX: 21.34 KG/M2 | WEIGHT: 125 LBS

## 2025-08-15 DIAGNOSIS — J06.9 ACUTE UPPER RESPIRATORY INFECTION, UNSPECIFIED: ICD-10-CM

## 2025-08-15 PROCEDURE — 99214 OFFICE O/P EST MOD 30 MIN: CPT

## 2025-08-15 PROCEDURE — G2211 COMPLEX E/M VISIT ADD ON: CPT

## 2025-08-15 RX ORDER — AZITHROMYCIN 250 MG/1
250 TABLET, FILM COATED ORAL
Qty: 1 | Refills: 0 | Status: ACTIVE | COMMUNITY
Start: 2025-08-15 | End: 1900-01-01

## 2025-08-15 RX ORDER — METHYLPREDNISOLONE 4 MG/1
4 TABLET ORAL
Qty: 1 | Refills: 0 | Status: ACTIVE | COMMUNITY
Start: 2025-08-15 | End: 1900-01-01